# Patient Record
Sex: FEMALE | Race: WHITE | NOT HISPANIC OR LATINO | Employment: OTHER | URBAN - METROPOLITAN AREA
[De-identification: names, ages, dates, MRNs, and addresses within clinical notes are randomized per-mention and may not be internally consistent; named-entity substitution may affect disease eponyms.]

---

## 2017-01-03 ENCOUNTER — HISTORICAL (OUTPATIENT)
Dept: RADIOLOGY | Facility: HOSPITAL | Age: 72
End: 2017-01-03

## 2017-01-05 ENCOUNTER — HISTORICAL (OUTPATIENT)
Dept: ADMINISTRATIVE | Facility: HOSPITAL | Age: 72
End: 2017-01-05

## 2017-02-15 ENCOUNTER — HISTORICAL (OUTPATIENT)
Dept: RADIOLOGY | Facility: HOSPITAL | Age: 72
End: 2017-02-15

## 2017-03-15 ENCOUNTER — HISTORICAL (OUTPATIENT)
Dept: ADMINISTRATIVE | Facility: HOSPITAL | Age: 72
End: 2017-03-15

## 2017-10-30 ENCOUNTER — HISTORICAL (OUTPATIENT)
Dept: ADMINISTRATIVE | Facility: HOSPITAL | Age: 72
End: 2017-10-30

## 2017-10-30 LAB
BUN SERPL-MCNC: 12 MG/DL (ref 7–18)
CALCIUM SERPL-MCNC: 9.3 MG/DL (ref 8.5–10.1)
CHLORIDE SERPL-SCNC: 106 MMOL/L (ref 98–107)
CO2 SERPL-SCNC: 28 MMOL/L (ref 21–32)
CREAT SERPL-MCNC: 0.75 MG/DL (ref 0.55–1.02)
DEPRECATED CALCIDIOL+CALCIFEROL SERPL-MC: 41.97 NG/ML (ref 30–80)
GLUCOSE SERPL-MCNC: 89 MG/DL (ref 74–106)
POTASSIUM SERPL-SCNC: 4.6 MMOL/L (ref 3.5–5.1)
SODIUM SERPL-SCNC: 141 MMOL/L (ref 136–145)
TSH SERPL-ACNC: 2.68 MIU/ML (ref 0.36–3.74)

## 2018-05-15 ENCOUNTER — HISTORICAL (OUTPATIENT)
Dept: ADMINISTRATIVE | Facility: HOSPITAL | Age: 73
End: 2018-05-15

## 2018-05-15 LAB
ABS NEUT (OLG): 3.11 X10(3)/MCL (ref 2.1–9.2)
ALBUMIN SERPL-MCNC: 3.9 GM/DL (ref 3.4–5)
ALBUMIN/GLOB SERPL: 1.3 {RATIO}
ALP SERPL-CCNC: 81 UNIT/L (ref 38–126)
ALT SERPL-CCNC: 25 UNIT/L (ref 12–78)
APPEARANCE, UA: CLEAR
AST SERPL-CCNC: 15 UNIT/L (ref 15–37)
BACTERIA SPEC CULT: ABNORMAL /HPF
BASOPHILS # BLD AUTO: 0 X10(3)/MCL (ref 0–0.2)
BASOPHILS NFR BLD AUTO: 1 %
BILIRUB SERPL-MCNC: 0.6 MG/DL (ref 0.2–1)
BILIRUB UR QL STRIP: NEGATIVE
BILIRUBIN DIRECT+TOT PNL SERPL-MCNC: 0.2 MG/DL (ref 0–0.2)
BILIRUBIN DIRECT+TOT PNL SERPL-MCNC: 0.4 MG/DL (ref 0–0.8)
BUN SERPL-MCNC: 17 MG/DL (ref 7–18)
CALCIUM SERPL-MCNC: 9.7 MG/DL (ref 8.5–10.1)
CHLORIDE SERPL-SCNC: 104 MMOL/L (ref 98–107)
CHOLEST SERPL-MCNC: 233 MG/DL (ref 0–200)
CHOLEST/HDLC SERPL: 4.2 {RATIO} (ref 0–4)
CO2 SERPL-SCNC: 30 MMOL/L (ref 21–32)
COLOR UR: YELLOW
CREAT SERPL-MCNC: 0.93 MG/DL (ref 0.55–1.02)
DEPRECATED CALCIDIOL+CALCIFEROL SERPL-MC: 52 NG/ML (ref 30–80)
EOSINOPHIL # BLD AUTO: 0.4 X10(3)/MCL (ref 0–0.9)
EOSINOPHIL NFR BLD AUTO: 7 %
ERYTHROCYTE [DISTWIDTH] IN BLOOD BY AUTOMATED COUNT: 14.5 % (ref 11.5–17)
GLOBULIN SER-MCNC: 3.1 GM/DL (ref 2.4–3.5)
GLUCOSE (UA): NEGATIVE
GLUCOSE SERPL-MCNC: 91 MG/DL (ref 74–106)
HCT VFR BLD AUTO: 45.3 % (ref 37–47)
HDLC SERPL-MCNC: 55 MG/DL (ref 35–60)
HGB BLD-MCNC: 14.2 GM/DL (ref 12–16)
HGB UR QL STRIP: NEGATIVE
KETONES UR QL STRIP: NEGATIVE
LDLC SERPL CALC-MCNC: 159 MG/DL (ref 0–129)
LEUKOCYTE ESTERASE UR QL STRIP: ABNORMAL
LYMPHOCYTES # BLD AUTO: 1.7 X10(3)/MCL (ref 0.6–4.6)
LYMPHOCYTES NFR BLD AUTO: 29 %
MCH RBC QN AUTO: 26.8 PG (ref 27–31)
MCHC RBC AUTO-ENTMCNC: 31.3 GM/DL (ref 33–36)
MCV RBC AUTO: 85.5 FL (ref 80–94)
MONOCYTES # BLD AUTO: 0.6 X10(3)/MCL (ref 0.1–1.3)
MONOCYTES NFR BLD AUTO: 10 %
NEUTROPHILS # BLD AUTO: 3.11 X10(3)/MCL (ref 2.1–9.2)
NEUTROPHILS NFR BLD AUTO: 53 %
NITRITE UR QL STRIP: NEGATIVE
PH UR STRIP: 6.5 [PH] (ref 5–9)
PLATELET # BLD AUTO: 292 X10(3)/MCL (ref 130–400)
PMV BLD AUTO: 9.4 FL (ref 9.4–12.4)
POTASSIUM SERPL-SCNC: 4.7 MMOL/L (ref 3.5–5.1)
PROT SERPL-MCNC: 7 GM/DL (ref 6.4–8.2)
PROT UR QL STRIP: NEGATIVE
RBC # BLD AUTO: 5.3 X10(6)/MCL (ref 4.2–5.4)
RBC #/AREA URNS HPF: ABNORMAL /HPF
SODIUM SERPL-SCNC: 142 MMOL/L (ref 136–145)
SP GR UR STRIP: 1.02 (ref 1–1.03)
SQUAMOUS EPITHELIAL, UA: ABNORMAL
TRIGL SERPL-MCNC: 93 MG/DL (ref 30–150)
TSH SERPL-ACNC: 6.96 MIU/L (ref 0.36–3.74)
UROBILINOGEN UR STRIP-ACNC: 1
VLDLC SERPL CALC-MCNC: 19 MG/DL
WBC # SPEC AUTO: 5.9 X10(3)/MCL (ref 4.5–11.5)
WBC #/AREA URNS HPF: 8 /HPF (ref 0–3)

## 2018-09-06 ENCOUNTER — HISTORICAL (OUTPATIENT)
Dept: LAB | Facility: HOSPITAL | Age: 73
End: 2018-09-06

## 2018-09-06 LAB — TSH SERPL-ACNC: 0.09 MIU/ML (ref 0.36–3.74)

## 2018-12-10 ENCOUNTER — HISTORICAL (OUTPATIENT)
Dept: LAB | Facility: HOSPITAL | Age: 73
End: 2018-12-10

## 2018-12-10 LAB
ABS NEUT (OLG): 5.88 X10(3)/MCL (ref 2.1–9.2)
BASOPHILS # BLD AUTO: 0 X10(3)/MCL (ref 0–0.2)
BASOPHILS NFR BLD AUTO: 1 %
BUN SERPL-MCNC: 13.8 MG/DL (ref 7–18)
CALCIUM SERPL-MCNC: 9.8 MG/DL (ref 8.5–10.1)
CHLORIDE SERPL-SCNC: 101 MMOL/L (ref 98–107)
CO2 SERPL-SCNC: 27.8 MMOL/L (ref 21–32)
CREAT SERPL-MCNC: 0.86 MG/DL (ref 0.6–1.3)
CREAT/UREA NIT SERPL: 16
EOSINOPHIL # BLD AUTO: 0.2 X10(3)/MCL (ref 0–0.9)
EOSINOPHIL NFR BLD AUTO: 2 %
ERYTHROCYTE [DISTWIDTH] IN BLOOD BY AUTOMATED COUNT: 14.1 % (ref 11.5–17)
GLUCOSE SERPL-MCNC: 95 MG/DL (ref 74–106)
HCT VFR BLD AUTO: 46.9 % (ref 37–47)
HGB BLD-MCNC: 15.3 GM/DL (ref 12–16)
IMM GRANULOCYTES # BLD AUTO: 0.01 % (ref 0–0.02)
IMM GRANULOCYTES NFR BLD AUTO: 0.1 % (ref 0–0.43)
LYMPHOCYTES # BLD AUTO: 2.2 X10(3)/MCL (ref 0.6–4.6)
LYMPHOCYTES NFR BLD AUTO: 25 %
MCH RBC QN AUTO: 26.8 PG (ref 27–31)
MCHC RBC AUTO-ENTMCNC: 32.6 GM/DL (ref 33–36)
MCV RBC AUTO: 82.1 FL (ref 80–94)
MONOCYTES # BLD AUTO: 0.6 X10(3)/MCL (ref 0.1–1.3)
MONOCYTES NFR BLD AUTO: 6 %
NEUTROPHILS # BLD AUTO: 5.88 X10(3)/MCL (ref 1.4–7.9)
NEUTROPHILS NFR BLD AUTO: 66 %
PLATELET # BLD AUTO: 356 X10(3)/MCL (ref 130–400)
PMV BLD AUTO: 9 FL (ref 9.4–12.4)
POTASSIUM SERPL-SCNC: 3.8 MMOL/L (ref 3.5–5.1)
RBC # BLD AUTO: 5.71 X10(6)/MCL (ref 4.2–5.4)
SODIUM SERPL-SCNC: 139 MMOL/L (ref 136–145)
TSH SERPL-ACNC: 1.41 MIU/ML (ref 0.36–3.74)
WBC # SPEC AUTO: 9 X10(3)/MCL (ref 4.5–11.5)

## 2018-12-17 ENCOUNTER — HISTORICAL (OUTPATIENT)
Dept: RADIOLOGY | Facility: HOSPITAL | Age: 73
End: 2018-12-17

## 2019-06-11 ENCOUNTER — HISTORICAL (OUTPATIENT)
Dept: LAB | Facility: HOSPITAL | Age: 74
End: 2019-06-11

## 2019-06-11 LAB
ABS NEUT (OLG): 2.81 X10(3)/MCL (ref 2.1–9.2)
ALBUMIN SERPL-MCNC: 3.9 GM/DL (ref 3.4–5)
ALBUMIN/GLOB SERPL: 1.2 RATIO (ref 1.1–2)
ALP SERPL-CCNC: 74 UNIT/L (ref 46–116)
ALT SERPL-CCNC: 32 UNIT/L (ref 12–78)
APPEARANCE, UA: CLEAR
AST SERPL-CCNC: 29 UNIT/L (ref 15–37)
BACTERIA SPEC CULT: NORMAL
BASOPHILS # BLD AUTO: 0 X10(3)/MCL (ref 0–0.2)
BASOPHILS NFR BLD AUTO: 1 %
BILIRUB SERPL-MCNC: 1.1 MG/DL (ref 0.2–1)
BILIRUB UR QL STRIP: NEGATIVE
BILIRUBIN DIRECT+TOT PNL SERPL-MCNC: 0.26 MG/DL (ref 0–0.2)
BILIRUBIN DIRECT+TOT PNL SERPL-MCNC: 0.84 MG/DL (ref 0–0.8)
BUN SERPL-MCNC: 14.2 MG/DL (ref 7–18)
CALCIUM SERPL-MCNC: 9.5 MG/DL (ref 8.5–10.1)
CHLORIDE SERPL-SCNC: 104 MMOL/L (ref 98–107)
CHOLEST SERPL-MCNC: 195 MG/DL (ref 0–200)
CHOLEST/HDLC SERPL: 4.1 {RATIO} (ref 0–4)
CO2 SERPL-SCNC: 27.1 MMOL/L (ref 21–32)
COLOR UR: YELLOW
CREAT SERPL-MCNC: 0.96 MG/DL (ref 0.6–1.3)
DEPRECATED CALCIDIOL+CALCIFEROL SERPL-MC: 59.54 NG/ML (ref 30–80)
EOSINOPHIL # BLD AUTO: 0.2 X10(3)/MCL (ref 0–0.9)
EOSINOPHIL NFR BLD AUTO: 4 %
ERYTHROCYTE [DISTWIDTH] IN BLOOD BY AUTOMATED COUNT: 14.2 % (ref 11.5–17)
GLOBULIN SER-MCNC: 3.2 GM/DL (ref 2.4–3.5)
GLUCOSE (UA): NEGATIVE
GLUCOSE SERPL-MCNC: 101 MG/DL (ref 74–106)
HCT VFR BLD AUTO: 41.3 % (ref 37–47)
HDLC SERPL-MCNC: 48 MG/DL (ref 40–60)
HGB BLD-MCNC: 14.3 GM/DL (ref 12–16)
HGB UR QL STRIP: NEGATIVE
KETONES UR QL STRIP: NEGATIVE
LDLC SERPL CALC-MCNC: 135 MG/DL (ref 0–129)
LEUKOCYTE ESTERASE UR QL STRIP: NEGATIVE
LYMPHOCYTES # BLD AUTO: 2 X10(3)/MCL (ref 0.6–4.6)
LYMPHOCYTES NFR BLD AUTO: 36 %
MCH RBC QN AUTO: 27.4 PG (ref 27–31)
MCHC RBC AUTO-ENTMCNC: 34.6 GM/DL (ref 33–36)
MCV RBC AUTO: 79.3 FL (ref 80–94)
MONOCYTES # BLD AUTO: 0.5 X10(3)/MCL (ref 0.1–1.3)
MONOCYTES NFR BLD AUTO: 9 %
NEUTROPHILS # BLD AUTO: 2.81 X10(3)/MCL (ref 1.4–7.9)
NEUTROPHILS NFR BLD AUTO: 50 %
NITRITE UR QL STRIP: NEGATIVE
PH UR STRIP: 6 [PH] (ref 5–9)
PLATELET # BLD AUTO: 285 X10(3)/MCL (ref 130–400)
PMV BLD AUTO: 8.8 FL (ref 9.4–12.4)
POTASSIUM SERPL-SCNC: 3.6 MMOL/L (ref 3.5–5.1)
PROT SERPL-MCNC: 7.1 GM/DL (ref 6.4–8.2)
PROT UR QL STRIP: NEGATIVE
RBC # BLD AUTO: 5.21 X10(6)/MCL (ref 4.2–5.4)
RBC #/AREA URNS HPF: NORMAL /[HPF]
SODIUM SERPL-SCNC: 144 MMOL/L (ref 136–145)
SP GR UR STRIP: 1.01 (ref 1–1.03)
SQUAMOUS EPITHELIAL, UA: NORMAL
TRIGL SERPL-MCNC: 62 MG/DL
TSH SERPL-ACNC: 1.29 MIU/ML (ref 0.36–3.74)
UROBILINOGEN UR STRIP-ACNC: 0.2
VLDLC SERPL CALC-MCNC: 12 MG/DL
WBC # SPEC AUTO: 5.6 X10(3)/MCL (ref 4.5–11.5)
WBC #/AREA URNS HPF: NORMAL /[HPF]

## 2019-12-31 ENCOUNTER — HISTORICAL (OUTPATIENT)
Dept: LAB | Facility: HOSPITAL | Age: 74
End: 2019-12-31

## 2019-12-31 LAB
BUN SERPL-MCNC: 13.6 MG/DL (ref 7–18)
CALCIUM SERPL-MCNC: 9.7 MG/DL (ref 8.5–10.1)
CHLORIDE SERPL-SCNC: 104 MMOL/L (ref 98–107)
CO2 SERPL-SCNC: 28.5 MMOL/L (ref 21–32)
CREAT SERPL-MCNC: 0.83 MG/DL (ref 0.6–1.3)
CREAT/UREA NIT SERPL: 16
GLUCOSE SERPL-MCNC: 99 MG/DL (ref 74–106)
POTASSIUM SERPL-SCNC: 4.1 MMOL/L (ref 3.5–5.1)
SODIUM SERPL-SCNC: 143 MMOL/L (ref 136–145)
TSH SERPL-ACNC: 3.15 MIU/ML (ref 0.36–3.74)

## 2020-08-17 ENCOUNTER — HISTORICAL (OUTPATIENT)
Dept: LAB | Facility: HOSPITAL | Age: 75
End: 2020-08-17

## 2020-08-17 LAB
ABS NEUT (OLG): 2.68 X10(3)/MCL (ref 2.1–9.2)
ALBUMIN SERPL-MCNC: 3.8 GM/DL (ref 3.4–5)
ALBUMIN/GLOB SERPL: 1.2 RATIO (ref 1.1–2)
ALP SERPL-CCNC: 82 UNIT/L (ref 46–116)
ALT SERPL-CCNC: 24 UNIT/L (ref 12–78)
AST SERPL-CCNC: 17 UNIT/L (ref 15–37)
BASOPHILS # BLD AUTO: 0.1 X10(3)/MCL (ref 0–0.2)
BASOPHILS NFR BLD AUTO: 1 %
BILIRUB SERPL-MCNC: 0.7 MG/DL (ref 0.2–1)
BILIRUBIN DIRECT+TOT PNL SERPL-MCNC: 0.12 MG/DL (ref 0–0.2)
BILIRUBIN DIRECT+TOT PNL SERPL-MCNC: 0.58 MG/DL (ref 0–0.8)
BUN SERPL-MCNC: 12.1 MG/DL (ref 7–18)
CALCIUM SERPL-MCNC: 10 MG/DL (ref 8.5–10.1)
CHLORIDE SERPL-SCNC: 104 MMOL/L (ref 98–107)
CHOLEST SERPL-MCNC: 218 MG/DL (ref 0–200)
CHOLEST/HDLC SERPL: 4 {RATIO} (ref 0–4)
CO2 SERPL-SCNC: 27.6 MMOL/L (ref 21–32)
CREAT SERPL-MCNC: 0.84 MG/DL (ref 0.6–1.3)
DEPRECATED CALCIDIOL+CALCIFEROL SERPL-MC: 101.6 NG/ML (ref 6.6–49.9)
EOSINOPHIL # BLD AUTO: 0.3 X10(3)/MCL (ref 0–0.9)
EOSINOPHIL NFR BLD AUTO: 6 %
ERYTHROCYTE [DISTWIDTH] IN BLOOD BY AUTOMATED COUNT: 13.6 % (ref 11.5–17)
GLOBULIN SER-MCNC: 3.2 GM/DL (ref 2.4–3.5)
GLUCOSE SERPL-MCNC: 92 MG/DL (ref 74–106)
HCT VFR BLD AUTO: 43.6 % (ref 37–47)
HDLC SERPL-MCNC: 54 MG/DL (ref 40–60)
HGB BLD-MCNC: 14.3 GM/DL (ref 12–16)
IMM GRANULOCYTES # BLD AUTO: 0.01 % (ref 0–0.02)
IMM GRANULOCYTES NFR BLD AUTO: 0.2 % (ref 0–0.43)
LDLC SERPL CALC-MCNC: 140 MG/DL (ref 0–129)
LYMPHOCYTES # BLD AUTO: 1.6 X10(3)/MCL (ref 0.6–4.6)
LYMPHOCYTES NFR BLD AUTO: 30 %
MCH RBC QN AUTO: 27.4 PG (ref 27–31)
MCHC RBC AUTO-ENTMCNC: 32.8 GM/DL (ref 33–36)
MCV RBC AUTO: 83.7 FL (ref 80–94)
MONOCYTES # BLD AUTO: 0.6 X10(3)/MCL (ref 0.1–1.3)
MONOCYTES NFR BLD AUTO: 11 %
NEUTROPHILS # BLD AUTO: 2.68 X10(3)/MCL (ref 1.4–7.9)
NEUTROPHILS NFR BLD AUTO: 52 %
PLATELET # BLD AUTO: 306 X10(3)/MCL (ref 130–400)
PMV BLD AUTO: 9.4 FL (ref 9.4–12.4)
POTASSIUM SERPL-SCNC: 4.6 MMOL/L (ref 3.5–5.1)
PROT SERPL-MCNC: 7 GM/DL (ref 6.4–8.2)
RBC # BLD AUTO: 5.21 X10(6)/MCL (ref 4.2–5.4)
SODIUM SERPL-SCNC: 143 MMOL/L (ref 136–145)
TRIGL SERPL-MCNC: 119 MG/DL
TSH SERPL-ACNC: 0.61 MIU/ML (ref 0.36–3.74)
VLDLC SERPL CALC-MCNC: 24 MG/DL
WBC # SPEC AUTO: 5.2 X10(3)/MCL (ref 4.5–11.5)

## 2021-08-16 ENCOUNTER — HISTORICAL (OUTPATIENT)
Dept: LAB | Facility: HOSPITAL | Age: 76
End: 2021-08-16

## 2021-08-16 LAB
ABS NEUT (OLG): 3.72 X10(3)/MCL (ref 2.1–9.2)
ALBUMIN SERPL-MCNC: 3.8 GM/DL (ref 3.4–4.8)
ALBUMIN/GLOB SERPL: 1.4 RATIO (ref 1.1–2)
ALP SERPL-CCNC: 74 UNIT/L (ref 40–150)
ALT SERPL-CCNC: 16 UNIT/L (ref 0–55)
APPEARANCE, UA: CLEAR
AST SERPL-CCNC: 18 UNIT/L (ref 5–34)
BACTERIA SPEC CULT: NORMAL
BASOPHILS # BLD AUTO: 0 X10(3)/MCL (ref 0–0.2)
BASOPHILS NFR BLD AUTO: 1 %
BILIRUB SERPL-MCNC: 0.7 MG/DL
BILIRUB UR QL STRIP: NEGATIVE
BILIRUBIN DIRECT+TOT PNL SERPL-MCNC: 0.3 MG/DL (ref 0–0.5)
BILIRUBIN DIRECT+TOT PNL SERPL-MCNC: 0.4 MG/DL (ref 0–0.8)
BUN SERPL-MCNC: 11.2 MG/DL (ref 9.8–20.1)
CALCIUM SERPL-MCNC: 9.6 MG/DL (ref 8.4–10.2)
CHLORIDE SERPL-SCNC: 106 MMOL/L (ref 98–107)
CHOLEST SERPL-MCNC: 186 MG/DL
CHOLEST/HDLC SERPL: 3 {RATIO} (ref 0–5)
CO2 SERPL-SCNC: 25 MMOL/L (ref 23–31)
COLOR UR: YELLOW
CREAT SERPL-MCNC: 0.72 MG/DL (ref 0.55–1.02)
DEPRECATED CALCIDIOL+CALCIFEROL SERPL-MC: 109.5 NG/ML (ref 30–80)
EOSINOPHIL # BLD AUTO: 0.4 X10(3)/MCL (ref 0–0.9)
EOSINOPHIL NFR BLD AUTO: 5 %
ERYTHROCYTE [DISTWIDTH] IN BLOOD BY AUTOMATED COUNT: 14 % (ref 11.5–17)
GLOBULIN SER-MCNC: 2.8 GM/DL (ref 2.4–3.5)
GLUCOSE (UA): NEGATIVE
GLUCOSE SERPL-MCNC: 78 MG/DL (ref 82–115)
HCT VFR BLD AUTO: 45.1 % (ref 37–47)
HDLC SERPL-MCNC: 56 MG/DL (ref 35–60)
HGB BLD-MCNC: 14.9 GM/DL (ref 12–16)
HGB UR QL STRIP: NEGATIVE
IMM GRANULOCYTES # BLD AUTO: 0.01 % (ref 0–0.02)
IMM GRANULOCYTES NFR BLD AUTO: 0.1 % (ref 0–0.43)
KETONES UR QL STRIP: NEGATIVE
LDLC SERPL CALC-MCNC: 113 MG/DL (ref 50–140)
LEUKOCYTE ESTERASE UR QL STRIP: NORMAL
LYMPHOCYTES # BLD AUTO: 2.4 X10(3)/MCL (ref 0.6–4.6)
LYMPHOCYTES NFR BLD AUTO: 34 %
MCH RBC QN AUTO: 27.9 PG (ref 27–31)
MCHC RBC AUTO-ENTMCNC: 33 GM/DL (ref 33–36)
MCV RBC AUTO: 84.5 FL (ref 80–94)
MONOCYTES # BLD AUTO: 0.5 X10(3)/MCL (ref 0.1–1.3)
MONOCYTES NFR BLD AUTO: 7 %
NEUTROPHILS # BLD AUTO: 3.72 X10(3)/MCL (ref 1.4–7.9)
NEUTROPHILS NFR BLD AUTO: 53 %
NITRITE UR QL STRIP: NEGATIVE
PH UR STRIP: 7 [PH] (ref 5–9)
PLATELET # BLD AUTO: 331 X10(3)/MCL (ref 130–400)
PMV BLD AUTO: 9.3 FL (ref 9.4–12.4)
POTASSIUM SERPL-SCNC: 3.5 MMOL/L (ref 3.5–5.1)
PROT SERPL-MCNC: 6.6 GM/DL (ref 5.8–7.6)
PROT UR QL STRIP: NEGATIVE
RBC # BLD AUTO: 5.34 X10(6)/MCL (ref 4.2–5.4)
RBC #/AREA URNS HPF: NORMAL /[HPF]
SODIUM SERPL-SCNC: 143 MMOL/L (ref 136–145)
SP GR UR STRIP: 1.02 (ref 1–1.03)
SQUAMOUS EPITHELIAL, UA: NORMAL
TRIGL SERPL-MCNC: 85 MG/DL (ref 37–140)
TSH SERPL-ACNC: 0.34 UIU/ML (ref 0.35–4.94)
UROBILINOGEN UR STRIP-ACNC: 0.2
VLDLC SERPL CALC-MCNC: 17 MG/DL
WBC # SPEC AUTO: 7 X10(3)/MCL (ref 4.5–11.5)
WBC #/AREA URNS HPF: NORMAL /HPF

## 2022-03-21 ENCOUNTER — HISTORICAL (OUTPATIENT)
Dept: LAB | Facility: HOSPITAL | Age: 77
End: 2022-03-21

## 2022-03-21 LAB
ABS NEUT (OLG): 2.98 (ref 2.1–9.2)
BASOPHILS # BLD AUTO: 0 10*3/UL (ref 0–0.2)
BASOPHILS NFR BLD AUTO: 1 %
BUN SERPL-MCNC: 7.9 MG/DL (ref 9.8–20.1)
CALCIUM SERPL-MCNC: 9.9 MG/DL (ref 8.7–10.5)
CHLORIDE SERPL-SCNC: 108 MMOL/L (ref 98–107)
CO2 SERPL-SCNC: 24 MMOL/L (ref 23–31)
CREAT SERPL-MCNC: 0.76 MG/DL (ref 0.55–1.02)
CREAT/UREA NIT SERPL: 10
EOSINOPHIL # BLD AUTO: 0.3 10*3/UL (ref 0–0.9)
EOSINOPHIL NFR BLD AUTO: 5 %
ERYTHROCYTE [DISTWIDTH] IN BLOOD BY AUTOMATED COUNT: 14.9 % (ref 11.5–17)
GLUCOSE SERPL-MCNC: 106 MG/DL (ref 82–115)
HCT VFR BLD AUTO: 45.3 % (ref 37–47)
HEMOLYSIS INTERF INDEX SERPL-ACNC: 5
HGB BLD-MCNC: 14.3 G/DL (ref 12–16)
ICTERIC INTERF INDEX SERPL-ACNC: 1
IMM GRANULOCYTES # BLD AUTO: 0.01 10*3/UL (ref 0–0.02)
IMM GRANULOCYTES NFR BLD AUTO: 0.2 % (ref 0–0.43)
LIPEMIC INTERF INDEX SERPL-ACNC: <0
LYMPHOCYTES # BLD AUTO: 2 10*3/UL (ref 0.6–4.6)
LYMPHOCYTES NFR BLD AUTO: 35 %
MANUAL DIFF? (OHS): NO
MCH RBC QN AUTO: 27.3 PG (ref 27–31)
MCHC RBC AUTO-ENTMCNC: 31.6 G/DL (ref 33–36)
MCV RBC AUTO: 86.5 FL (ref 80–94)
MONOCYTES # BLD AUTO: 0.4 10*3/UL (ref 0.1–1.3)
MONOCYTES NFR BLD AUTO: 8 %
NEUTROPHILS # BLD AUTO: 2.98 10*3/UL (ref 1.4–7.9)
NEUTROPHILS NFR BLD AUTO: 51 %
PLATELET # BLD AUTO: 365 10*3/UL (ref 130–400)
PMV BLD AUTO: 9 FL (ref 9.4–12.4)
POTASSIUM SERPL-SCNC: 4.6 MMOL/L (ref 3.5–5.1)
RBC # BLD AUTO: 5.24 10*6/UL (ref 4.2–5.4)
SODIUM SERPL-SCNC: 143 MMOL/L (ref 136–145)
TSH SERPL-ACNC: 4.27 M[IU]/L (ref 0.35–4.94)
WBC # SPEC AUTO: 5.8 10*3/UL (ref 4.5–11.5)

## 2022-03-28 ENCOUNTER — HISTORICAL (OUTPATIENT)
Dept: ADMINISTRATIVE | Facility: HOSPITAL | Age: 77
End: 2022-03-28

## 2022-10-18 ENCOUNTER — LAB VISIT (OUTPATIENT)
Dept: LAB | Facility: HOSPITAL | Age: 77
End: 2022-10-18
Attending: INTERNAL MEDICINE
Payer: MEDICARE

## 2022-10-18 DIAGNOSIS — B36.9: Primary | ICD-10-CM

## 2022-10-18 DIAGNOSIS — M85.88 MASS OF HARD PALATE: ICD-10-CM

## 2022-10-18 DIAGNOSIS — I51.9 MYXEDEMA HEART DISEASE: ICD-10-CM

## 2022-10-18 DIAGNOSIS — I10 ESSENTIAL HYPERTENSION, MALIGNANT: ICD-10-CM

## 2022-10-18 DIAGNOSIS — E03.9 MYXEDEMA HEART DISEASE: ICD-10-CM

## 2022-10-18 DIAGNOSIS — Z00.00 ROUTINE GENERAL MEDICAL EXAMINATION AT A HEALTH CARE FACILITY: ICD-10-CM

## 2022-10-18 DIAGNOSIS — E78.5 HYPERLIPIDEMIA, UNSPECIFIED HYPERLIPIDEMIA TYPE: ICD-10-CM

## 2022-10-18 DIAGNOSIS — E55.9 AVITAMINOSIS D: ICD-10-CM

## 2022-10-18 LAB
ALBUMIN SERPL-MCNC: 3.9 GM/DL (ref 3.4–4.8)
ALBUMIN/GLOB SERPL: 1.3 RATIO (ref 1.1–2)
ALP SERPL-CCNC: 88 UNIT/L (ref 40–150)
ALT SERPL-CCNC: 19 UNIT/L (ref 0–55)
APPEARANCE UR: CLEAR
AST SERPL-CCNC: 19 UNIT/L (ref 5–34)
BACTERIA #/AREA URNS AUTO: NORMAL /HPF
BASOPHILS # BLD AUTO: 0.04 X10(3)/MCL (ref 0–0.2)
BASOPHILS NFR BLD AUTO: 0.7 %
BILIRUB UR QL STRIP.AUTO: NEGATIVE MG/DL
BILIRUBIN DIRECT+TOT PNL SERPL-MCNC: 1.2 MG/DL
BUN SERPL-MCNC: 10.9 MG/DL (ref 9.8–20.1)
CALCIUM SERPL-MCNC: 10 MG/DL (ref 8.4–10.2)
CHLORIDE SERPL-SCNC: 107 MMOL/L (ref 98–107)
CHOLEST SERPL-MCNC: 194 MG/DL
CHOLEST/HDLC SERPL: 3 {RATIO} (ref 0–5)
CO2 SERPL-SCNC: 26 MMOL/L (ref 23–31)
COLOR UR AUTO: YELLOW
CREAT SERPL-MCNC: 0.75 MG/DL (ref 0.55–1.02)
DEPRECATED CALCIDIOL+CALCIFEROL SERPL-MC: 90.2 NG/ML (ref 30–80)
EOSINOPHIL # BLD AUTO: 0.41 X10(3)/MCL (ref 0–0.9)
EOSINOPHIL NFR BLD AUTO: 7.1 %
ERYTHROCYTE [DISTWIDTH] IN BLOOD BY AUTOMATED COUNT: 13.5 % (ref 11.5–17)
GFR SERPLBLD CREATININE-BSD FMLA CKD-EPI: >60 MLS/MIN/1.73/M2
GLOBULIN SER-MCNC: 3 GM/DL (ref 2.4–3.5)
GLUCOSE SERPL-MCNC: 96 MG/DL (ref 82–115)
GLUCOSE UR QL STRIP.AUTO: NEGATIVE MG/DL
HCT VFR BLD AUTO: 47.8 % (ref 37–47)
HDLC SERPL-MCNC: 59 MG/DL (ref 35–60)
HGB BLD-MCNC: 15 GM/DL (ref 12–16)
IMM GRANULOCYTES # BLD AUTO: 0.01 X10(3)/MCL (ref 0–0.04)
IMM GRANULOCYTES NFR BLD AUTO: 0.2 %
KETONES UR QL STRIP.AUTO: NEGATIVE MG/DL
LDLC SERPL CALC-MCNC: 117 MG/DL (ref 50–140)
LEUKOCYTE ESTERASE UR QL STRIP.AUTO: ABNORMAL UNIT/L
LYMPHOCYTES # BLD AUTO: 2.09 X10(3)/MCL (ref 0.6–4.6)
LYMPHOCYTES NFR BLD AUTO: 36 %
MCH RBC QN AUTO: 27.7 PG (ref 27–31)
MCHC RBC AUTO-ENTMCNC: 31.4 MG/DL (ref 33–36)
MCV RBC AUTO: 88.2 FL (ref 80–94)
MONOCYTES # BLD AUTO: 0.33 X10(3)/MCL (ref 0.1–1.3)
MONOCYTES NFR BLD AUTO: 5.7 %
NEUTROPHILS # BLD AUTO: 2.9 X10(3)/MCL (ref 2.1–9.2)
NEUTROPHILS NFR BLD AUTO: 50.3 %
NITRITE UR QL STRIP.AUTO: NEGATIVE
PH UR STRIP.AUTO: 6.5 [PH]
PLATELET # BLD AUTO: 300 X10(3)/MCL (ref 130–400)
PMV BLD AUTO: 9.5 FL (ref 7.4–10.4)
POTASSIUM SERPL-SCNC: 4.6 MMOL/L (ref 3.5–5.1)
PROT SERPL-MCNC: 6.9 GM/DL (ref 5.8–7.6)
PROT UR QL STRIP.AUTO: NEGATIVE MG/DL
RBC # BLD AUTO: 5.42 X10(6)/MCL (ref 4.2–5.4)
RBC #/AREA URNS AUTO: NORMAL /HPF
RBC UR QL AUTO: NEGATIVE UNIT/L
SODIUM SERPL-SCNC: 143 MMOL/L (ref 136–145)
SP GR UR STRIP.AUTO: 1.02
SQUAMOUS #/AREA URNS AUTO: NORMAL /HPF
TRIGL SERPL-MCNC: 89 MG/DL (ref 37–140)
TSH SERPL-ACNC: 1.63 UIU/ML (ref 0.35–4.94)
UROBILINOGEN UR STRIP-ACNC: 0.2 MG/DL
VLDLC SERPL CALC-MCNC: 18 MG/DL
WBC # SPEC AUTO: 5.8 X10(3)/MCL (ref 4.5–11.5)
WBC #/AREA URNS AUTO: NORMAL /HPF

## 2022-10-18 PROCEDURE — 81001 URINALYSIS AUTO W/SCOPE: CPT

## 2022-10-18 PROCEDURE — 84443 ASSAY THYROID STIM HORMONE: CPT

## 2022-10-18 PROCEDURE — 80061 LIPID PANEL: CPT

## 2022-10-18 PROCEDURE — 85025 COMPLETE CBC W/AUTO DIFF WBC: CPT

## 2022-10-18 PROCEDURE — 80053 COMPREHEN METABOLIC PANEL: CPT

## 2022-10-18 PROCEDURE — 36415 COLL VENOUS BLD VENIPUNCTURE: CPT

## 2022-10-18 PROCEDURE — 82306 VITAMIN D 25 HYDROXY: CPT

## 2022-10-26 ENCOUNTER — TELEPHONE (OUTPATIENT)
Dept: INTERNAL MEDICINE | Facility: CLINIC | Age: 77
End: 2022-10-26
Payer: MEDICARE

## 2022-10-28 RX ORDER — BUSPIRONE HYDROCHLORIDE 15 MG/1
15 TABLET ORAL DAILY
COMMUNITY
Start: 2022-09-19 | End: 2022-11-02

## 2022-10-28 RX ORDER — LOSARTAN POTASSIUM 100 MG/1
100 TABLET ORAL DAILY
COMMUNITY
Start: 2022-10-04 | End: 2023-04-03

## 2022-11-01 ENCOUNTER — OFFICE VISIT (OUTPATIENT)
Dept: INTERNAL MEDICINE | Facility: CLINIC | Age: 77
End: 2022-11-01
Payer: MEDICARE

## 2022-11-01 VITALS
DIASTOLIC BLOOD PRESSURE: 76 MMHG | SYSTOLIC BLOOD PRESSURE: 132 MMHG | HEART RATE: 73 BPM | BODY MASS INDEX: 32.71 KG/M2 | HEIGHT: 63 IN | OXYGEN SATURATION: 98 % | WEIGHT: 184.63 LBS

## 2022-11-01 DIAGNOSIS — Z23 FLU VACCINE NEED: ICD-10-CM

## 2022-11-01 DIAGNOSIS — Z00.00 WELLNESS EXAMINATION: Primary | ICD-10-CM

## 2022-11-01 DIAGNOSIS — I10 ESSENTIAL (PRIMARY) HYPERTENSION: ICD-10-CM

## 2022-11-01 DIAGNOSIS — E55.9 VITAMIN D DEFICIENCY: ICD-10-CM

## 2022-11-01 DIAGNOSIS — E03.9 HYPOTHYROIDISM, UNSPECIFIED TYPE: ICD-10-CM

## 2022-11-01 DIAGNOSIS — F41.9 ANXIETY DISORDER, UNSPECIFIED TYPE: ICD-10-CM

## 2022-11-01 DIAGNOSIS — M79.604 PAIN OF RIGHT LOWER EXTREMITY: ICD-10-CM

## 2022-11-01 DIAGNOSIS — M85.80 OSTEOPENIA, UNSPECIFIED LOCATION: ICD-10-CM

## 2022-11-01 DIAGNOSIS — R79.89 ABNORMAL LIVER FUNCTION TEST: ICD-10-CM

## 2022-11-01 PROCEDURE — 3288F PR FALLS RISK ASSESSMENT DOCUMENTED: ICD-10-PCS | Mod: CPTII,,, | Performed by: INTERNAL MEDICINE

## 2022-11-01 PROCEDURE — G0008 ADMIN INFLUENZA VIRUS VAC: HCPCS | Mod: ,,, | Performed by: INTERNAL MEDICINE

## 2022-11-01 PROCEDURE — G0008 FLU VACCINE - QUADRIVALENT - ADJUVANTED: ICD-10-PCS | Mod: ,,, | Performed by: INTERNAL MEDICINE

## 2022-11-01 PROCEDURE — 1159F MED LIST DOCD IN RCRD: CPT | Mod: CPTII,,, | Performed by: INTERNAL MEDICINE

## 2022-11-01 PROCEDURE — 1100F PTFALLS ASSESS-DOCD GE2>/YR: CPT | Mod: CPTII,,, | Performed by: INTERNAL MEDICINE

## 2022-11-01 PROCEDURE — 3075F PR MOST RECENT SYSTOLIC BLOOD PRESS GE 130-139MM HG: ICD-10-PCS | Mod: CPTII,,, | Performed by: INTERNAL MEDICINE

## 2022-11-01 PROCEDURE — 3078F PR MOST RECENT DIASTOLIC BLOOD PRESSURE < 80 MM HG: ICD-10-PCS | Mod: CPTII,,, | Performed by: INTERNAL MEDICINE

## 2022-11-01 PROCEDURE — 3078F DIAST BP <80 MM HG: CPT | Mod: CPTII,,, | Performed by: INTERNAL MEDICINE

## 2022-11-01 PROCEDURE — G0439 PPPS, SUBSEQ VISIT: HCPCS | Mod: ,,, | Performed by: INTERNAL MEDICINE

## 2022-11-01 PROCEDURE — 1160F PR REVIEW ALL MEDS BY PRESCRIBER/CLIN PHARMACIST DOCUMENTED: ICD-10-PCS | Mod: CPTII,,, | Performed by: INTERNAL MEDICINE

## 2022-11-01 PROCEDURE — 1100F PR PT FALLS ASSESS DOC 2+ FALLS/FALL W/INJURY/YR: ICD-10-PCS | Mod: CPTII,,, | Performed by: INTERNAL MEDICINE

## 2022-11-01 PROCEDURE — G0439 PR MEDICARE ANNUAL WELLNESS SUBSEQUENT VISIT: ICD-10-PCS | Mod: ,,, | Performed by: INTERNAL MEDICINE

## 2022-11-01 PROCEDURE — 3075F SYST BP GE 130 - 139MM HG: CPT | Mod: CPTII,,, | Performed by: INTERNAL MEDICINE

## 2022-11-01 PROCEDURE — 1126F AMNT PAIN NOTED NONE PRSNT: CPT | Mod: CPTII,,, | Performed by: INTERNAL MEDICINE

## 2022-11-01 PROCEDURE — 1126F PR PAIN SEVERITY QUANTIFIED, NO PAIN PRESENT: ICD-10-PCS | Mod: CPTII,,, | Performed by: INTERNAL MEDICINE

## 2022-11-01 PROCEDURE — 3288F FALL RISK ASSESSMENT DOCD: CPT | Mod: CPTII,,, | Performed by: INTERNAL MEDICINE

## 2022-11-01 PROCEDURE — 90694 FLU VACCINE - QUADRIVALENT - ADJUVANTED: ICD-10-PCS | Mod: ,,, | Performed by: INTERNAL MEDICINE

## 2022-11-01 PROCEDURE — 1159F PR MEDICATION LIST DOCUMENTED IN MEDICAL RECORD: ICD-10-PCS | Mod: CPTII,,, | Performed by: INTERNAL MEDICINE

## 2022-11-01 PROCEDURE — 1160F RVW MEDS BY RX/DR IN RCRD: CPT | Mod: CPTII,,, | Performed by: INTERNAL MEDICINE

## 2022-11-01 PROCEDURE — 90694 VACC AIIV4 NO PRSRV 0.5ML IM: CPT | Mod: ,,, | Performed by: INTERNAL MEDICINE

## 2022-11-01 NOTE — PROGRESS NOTES
Blayne Bautista MD        PATIENT NAME: Jen Fitzgerald  : 1945  DATE: 22  MRN: [id number]      Patient Care Team:  Blayne Bautista MD as PCP - General (Internal Medicine)  Blayne Bautista MD       Billing Provider: Blayne Bautista MD  Level of Service: [place] AN[place] WELLNESS SUBSEQUENT VISIT  Patient PCP Information       Provider PCP Type    Blayne Bautista MD General            Reason for Visit / Chief Complaint: Medicare AWV (Wellness )       Update PCP  Update Chief Complaint         History of Present Illness / Problem Focused Workflow     Jen Fitzgerald presents to the clinic with Medicare AWV (Wellness )     Jen is here for annual Medicare wellness  She is doing well with no new problems   She did twist her right leg in her she is having pain in her right leg and ankle and would like physical therapy      Review of Systems   Review of Systems   Constitutional: Negative.    HENT: Negative.     Eyes: Negative.    Respiratory: Negative.     Cardiovascular: Negative.    Gastrointestinal: Negative.    Endocrine: Negative.    Genitourinary: Negative.    Musculoskeletal: Negative.    Integumentary:  Negative.   Neurological: Negative.    Psychiatric/Behavioral: Negative.        Patient Reported Health Risk Assessment  What [age] 70-79  Are you male or female?: Female  During the past four weeks, how much have you been bothered by emotional problems such as feeling anxious, depressed, irritable, sad, or downhearted and blue?: Not at all  During the past five weeks, has your physical and/or emotional health limited your social activities with family, friends, neighbors, or groups?: Not at all  During the past four weeks, how much bodily pain have you generally had?: Very mild pain  During the past four weeks, was someone available to help if you needed and wanted help?: Yes, as much as I wanted  During the past four weeks, what was the hardest physical activity you  could do for at least two minutes?: Very heavy  Can you get to places out of walking distance without help?  (For example, can you travel alone on buses or taxis, or drive your own car?): Yes  Can you go shopping for groceries or clothes without someone's help?: Yes  Can you prepare your own meals?: Yes  Can you do your own housework without help?: Yes  Because of any health problems, do you need the help of another person with your personal care needs such as eating, bathing, dressing, or getting around the house?: No  Can you handle your own money without help?: Yes  During the past four weeks, how would you rate your health in general?: Excellent  How have things been going for you during the past four weeks?: Very well  Are you having difficulties driving your car?: No  Do you always fasten your seat belt when you are in a car?: Yes, usually  How often in the past four weeks have you been bothered by falling or dizzy when standing up?: Never  How often in the past four weeks have you been bothered by trouble eating well?: Never  How often in the past four weeks have you been bothered by teeth or denture problems?: Never  How often in the past four weeks have you been bothered with problems using the telephone?: Never  How often in the past four weeks have you been bothered by tiredness or fatigue?: Seldom  Have you fallen two or more times in the past year?: Yes  Are you afraid of falling?: No  Are you a smoker?: No  During the past four weeks, how many drinks of wine, beer, or other alcoholic beverages did you have?: One drink or less per week  Do you exercise for about 20 minutes three or more days a week?: No, I usually do not exercise this much  Have you been given any information to help you with hazards in your house that might hurt you?: Yes  Have you been given any information to help you with keeping track of your medications?: Yes  How often do you have trouble taking medicines the way you've been told  to take them?: I always take them as prescribed  How confident are you that you can control and manage most of your health problems?: Very confident  What is your race? (Check all that apply.):     Medical / Social / Family History     Past Medical History:   Diagnosis Date    Abnormal liver function test     Anxiety disorder, unspecified     Essential (primary) hypertension     Hypothyroidism, unspecified     Osteopenia     Vitamin D deficiency        Past Surgical History:   Procedure Laterality Date    APPENDECTOMY      CHOLECYSTECTOMY      TONSILLECTOMY         Social History  Ms. Fitzgerald  reports that she has never smoked. She has never used smokeless tobacco. She reports that she does not currently use alcohol. She reports that she does not use drugs.    Family History  Ms.'s Fitzgerald  family history includes Asthma in her mother; Cancer in her father; Diabetes in her father; Hypertension in her mother.        Medications and Allergies     Medications  Outpatient Medications Marked as Taking for the 11/1/22 encounter (Office Visit) with Blayne Bautista MD   Medication Sig Dispense Refill    amLODIPine (NORVASC) 5 MG tablet TAKE ONE TABLET BY MOUTH EACH MORNING 30 tablet 11    ARMOUR THYROID 60 mg Tab TAKE ONE TABLET BY MOUTH EACH MORNING 30 tablet 5    busPIRone (BUSPAR) 15 MG tablet Take 15 mg by mouth once daily. Pt taking 1/3 of pill daily      losartan (COZAAR) 100 MG tablet Take 100 mg by mouth once daily.         Allergies  Review of patient's allergies indicates:   Allergen Reactions    Potassium chloride        Physical Examination     Vitals:    11/01/22 1019   BP: 132/76   Pulse: 73     Physical Exam  Constitutional:       Appearance: Normal appearance.   HENT:      Head: Normocephalic and atraumatic.      Right Ear: Tympanic membrane normal.      Left Ear: Tympanic membrane normal.      Nose: Nose normal.      Mouth/Throat:      Mouth: Mucous membranes are moist.   Eyes:      Extraocular  Movements: Extraocular movements intact.      Pupils: Pupils are equal, round, and reactive to light.   Cardiovascular:      Rate and Rhythm: Normal rate and regular rhythm.      Pulses: Normal pulses.   Pulmonary:      Effort: Pulmonary effort is normal.      Breath sounds: Normal breath sounds.   Abdominal:      General: Abdomen is flat. Bowel sounds are normal.      Palpations: Abdomen is soft.   Musculoskeletal:         General: Normal range of motion.      Cervical back: Normal range of motion and neck supple.   Skin:     General: Skin is warm and dry.   Neurological:      General: No focal deficit present.      Mental Status: She is alert and oriented to person, place, and time.   Psychiatric:         Mood and Affect: Mood normal.         Behavior: Behavior normal.        No flowsheet data found.  Fall Risk Assessment - Outpatient 11/1/2022   Mobility Status Ambulatory   Number of falls 2+   Identified as fall risk 1           Depression Screening  Over the past two weeks, has the patient felt down, depressed, or hopeless?: No  Over the past two weeks, has the patient felt little interest or pleasure in doing things?: No  Functional Ability/Safety Screening  Does the patient need help with phone, transportation, shopping, preparing meals, housework, laundry, meds, or managing money?: No  Does the patient's home have rugs in the hallway, lack grab bars in the bathroom, lack handrails on the stairs or have poor lighting?: No  Have you noticed any hearing difficulties?: No  Cognitive Function (Assessed through direct observation with due consideration of information obtained by way of patient reports and/or concerns raised by family, friends, caretakers, or others)    Does the patient repeat questions/statements in the same day?: No  Does the patient have trouble remembering the date, year, and time?: No  Does the patient have difficulty managing finances?: No  Does the patient have a decreased sense of  direction?: No    Assessment and Plan (including Health Maintenance)      Problem List  Smart Sets  Document Outside HM   :    Plan:   Wellness examination    Vitamin D deficiency    Osteopenia, unspecified location    Essential (primary) hypertension    Anxiety disorder, unspecified type    Hypothyroidism, unspecified type    Flu vaccine need  -     Influenza (FLUAD) - Quadrivalent (Adjuvanted) *Preferred* (65+) (PF)    Abnormal liver function test    Pain of right lower extremity     Discussed results all lab is stable   Referral to physical therapy made   Flu shot given   Revisit 1 year annual wellness    Orders Placed This Encounter    Influenza (FLUAD) - Quadrivalent (Adjuvanted) *Preferred* (65+) (PF)         Health Maintenance Due   Topic Date Due    Hepatitis C Screening  Never done    COVID-19 Vaccine (1) Never done    TETANUS VACCINE  Never done    Shingles Vaccine (1 of 2) Never done    Pneumococcal Vaccines (Age 65+) (1 - PCV) Never done    DEXA Scan  11/06/2020       Problem List Items Addressed This Visit          Psychiatric    Anxiety disorder, unspecified (Chronic)       Cardiac/Vascular    Essential (primary) hypertension (Chronic)       Endocrine    Vitamin D deficiency (Chronic)    Hypothyroidism, unspecified (Chronic)       GI    Abnormal liver function test       Orthopedic    Osteopenia (Chronic)     Other Visit Diagnoses       Wellness examination    -  Primary    Flu vaccine need        Pain of right lower extremity                Health Maintenance Topics with due status: Not Due       Topic Last Completion Date    Lipid Panel 10/18/2022       No future appointments.       Medicare Annual Wellness and Personalized Prevention Plan:   Fall Risk + Home Safety + Hearing Impairment + Depression Screen + Cognitive Impairment Screen + Health Risk Assessment all reviewed.         Advance Care Planning   I attest to discussing Advance Care Planning with patient and/or family member.  Education was  provided including the importance of the Health Care Power of , Advance Directives, and/or [place] documentation.  The patient expressed understanding to the importance of this information and discussion.       Opioid Screening: Patient medication list reviewed, patient is not taking prescription opioids. Patient is not using additional opioids than prescribed. Patient is at low risk of substance abuse based on this opioid use history.        Signature:  Blayne Cee MD  [facility]  [address] [place] [phone]    Date of encounter: 11/1/22    Follow up in about 1 year (around 11/1/2023) for Medicare Wellness. In addition to their scheduled follow up, the patient has also been instructed to follow up on as needed basis.

## 2022-11-02 DIAGNOSIS — F41.9 ANXIETY DISORDER, UNSPECIFIED TYPE: Primary | ICD-10-CM

## 2022-11-02 RX ORDER — BUSPIRONE HYDROCHLORIDE 15 MG/1
TABLET ORAL
Qty: 30 TABLET | Refills: 5 | Status: SHIPPED | OUTPATIENT
Start: 2022-11-02 | End: 2023-07-05

## 2023-07-05 DIAGNOSIS — F41.9 ANXIETY DISORDER, UNSPECIFIED TYPE: ICD-10-CM

## 2023-07-05 RX ORDER — BUSPIRONE HYDROCHLORIDE 15 MG/1
TABLET ORAL
Qty: 30 TABLET | Refills: 5 | Status: SHIPPED | OUTPATIENT
Start: 2023-07-05

## 2023-08-31 DIAGNOSIS — I10 ESSENTIAL (PRIMARY) HYPERTENSION: Primary | ICD-10-CM

## 2023-08-31 RX ORDER — AMLODIPINE BESYLATE 5 MG/1
TABLET ORAL
Qty: 30 TABLET | Refills: 11 | Status: SHIPPED | OUTPATIENT
Start: 2023-08-31

## 2023-09-12 DIAGNOSIS — E03.9 HYPOTHYROIDISM, UNSPECIFIED TYPE: Primary | ICD-10-CM

## 2023-09-12 RX ORDER — SULFAMETHOXAZOLE AND TRIMETHOPRIM 800; 160 MG/1; MG/1
TABLET ORAL
Qty: 30 TABLET | Refills: 11 | Status: SHIPPED | OUTPATIENT
Start: 2023-09-12

## 2023-11-22 ENCOUNTER — LAB VISIT (OUTPATIENT)
Dept: LAB | Facility: HOSPITAL | Age: 78
End: 2023-11-22
Attending: INTERNAL MEDICINE
Payer: MEDICARE

## 2023-11-22 DIAGNOSIS — E55.9 VITAMIN D DEFICIENCY: ICD-10-CM

## 2023-11-22 DIAGNOSIS — I10 ESSENTIAL (PRIMARY) HYPERTENSION: ICD-10-CM

## 2023-11-22 LAB
ALBUMIN SERPL-MCNC: 3.9 G/DL (ref 3.4–4.8)
ALBUMIN/GLOB SERPL: 1.2 RATIO (ref 1.1–2)
ALP SERPL-CCNC: 85 UNIT/L (ref 40–150)
ALT SERPL-CCNC: 20 UNIT/L (ref 0–55)
APPEARANCE UR: CLEAR
AST SERPL-CCNC: 15 UNIT/L (ref 5–34)
BACTERIA #/AREA URNS AUTO: NORMAL /HPF
BASOPHILS # BLD AUTO: 0.04 X10(3)/MCL
BASOPHILS NFR BLD AUTO: 0.8 %
BILIRUB SERPL-MCNC: 1 MG/DL
BILIRUB UR QL STRIP.AUTO: NEGATIVE
BUN SERPL-MCNC: 11.1 MG/DL (ref 9.8–20.1)
CALCIUM SERPL-MCNC: 9.8 MG/DL (ref 8.4–10.2)
CHLORIDE SERPL-SCNC: 102 MMOL/L (ref 98–107)
CHOLEST SERPL-MCNC: 206 MG/DL
CHOLEST/HDLC SERPL: 4 {RATIO} (ref 0–5)
CO2 SERPL-SCNC: 24 MMOL/L (ref 23–31)
COLOR UR AUTO: YELLOW
CREAT SERPL-MCNC: 0.81 MG/DL (ref 0.55–1.02)
DEPRECATED CALCIDIOL+CALCIFEROL SERPL-MC: 75.4 NG/ML (ref 30–80)
EOSINOPHIL # BLD AUTO: 0.35 X10(3)/MCL (ref 0–0.9)
EOSINOPHIL NFR BLD AUTO: 6.6 %
ERYTHROCYTE [DISTWIDTH] IN BLOOD BY AUTOMATED COUNT: 13.7 % (ref 11.5–17)
GFR SERPLBLD CREATININE-BSD FMLA CKD-EPI: >60 MLS/MIN/1.73/M2
GLOBULIN SER-MCNC: 3.3 GM/DL (ref 2.4–3.5)
GLUCOSE SERPL-MCNC: 95 MG/DL (ref 82–115)
GLUCOSE UR QL STRIP.AUTO: NEGATIVE
HCT VFR BLD AUTO: 47.6 % (ref 37–47)
HDLC SERPL-MCNC: 56 MG/DL (ref 35–60)
HGB BLD-MCNC: 14.9 G/DL (ref 12–16)
IMM GRANULOCYTES # BLD AUTO: 0.01 X10(3)/MCL (ref 0–0.04)
IMM GRANULOCYTES NFR BLD AUTO: 0.2 %
KETONES UR QL STRIP.AUTO: NEGATIVE
LDLC SERPL CALC-MCNC: 131 MG/DL (ref 50–140)
LEUKOCYTE ESTERASE UR QL STRIP.AUTO: ABNORMAL
LYMPHOCYTES # BLD AUTO: 1.46 X10(3)/MCL (ref 0.6–4.6)
LYMPHOCYTES NFR BLD AUTO: 27.4 %
MCH RBC QN AUTO: 27.4 PG (ref 27–31)
MCHC RBC AUTO-ENTMCNC: 31.3 G/DL (ref 33–36)
MCV RBC AUTO: 87.5 FL (ref 80–94)
MONOCYTES # BLD AUTO: 0.57 X10(3)/MCL (ref 0.1–1.3)
MONOCYTES NFR BLD AUTO: 10.7 %
NEUTROPHILS # BLD AUTO: 2.89 X10(3)/MCL (ref 2.1–9.2)
NEUTROPHILS NFR BLD AUTO: 54.3 %
NITRITE UR QL STRIP.AUTO: NEGATIVE
PH UR STRIP.AUTO: 7 [PH]
PLATELET # BLD AUTO: 311 X10(3)/MCL (ref 130–400)
PMV BLD AUTO: 8.9 FL (ref 7.4–10.4)
POTASSIUM SERPL-SCNC: 4.2 MMOL/L (ref 3.5–5.1)
PROT SERPL-MCNC: 7.2 GM/DL (ref 5.8–7.6)
PROT UR QL STRIP.AUTO: NEGATIVE
RBC # BLD AUTO: 5.44 X10(6)/MCL (ref 4.2–5.4)
RBC #/AREA URNS AUTO: NORMAL /HPF
RBC UR QL AUTO: NEGATIVE
SODIUM SERPL-SCNC: 136 MMOL/L (ref 136–145)
SP GR UR STRIP.AUTO: 1.01 (ref 1–1.03)
SQUAMOUS #/AREA URNS AUTO: NORMAL /HPF
TRIGL SERPL-MCNC: 96 MG/DL (ref 37–140)
UROBILINOGEN UR STRIP-ACNC: 0.2
VLDLC SERPL CALC-MCNC: 19 MG/DL
WBC # SPEC AUTO: 5.32 X10(3)/MCL (ref 4.5–11.5)
WBC #/AREA URNS AUTO: NORMAL /HPF

## 2023-11-22 PROCEDURE — 80053 COMPREHEN METABOLIC PANEL: CPT

## 2023-11-22 PROCEDURE — 80061 LIPID PANEL: CPT

## 2023-11-22 PROCEDURE — 85025 COMPLETE CBC W/AUTO DIFF WBC: CPT

## 2023-11-22 PROCEDURE — 82306 VITAMIN D 25 HYDROXY: CPT

## 2023-11-22 PROCEDURE — 36415 COLL VENOUS BLD VENIPUNCTURE: CPT

## 2023-11-22 PROCEDURE — 81001 URINALYSIS AUTO W/SCOPE: CPT

## 2023-11-30 ENCOUNTER — TELEPHONE (OUTPATIENT)
Dept: INTERNAL MEDICINE | Facility: CLINIC | Age: 78
End: 2023-11-30
Payer: MEDICARE

## 2023-12-04 PROBLEM — Z00.00 WELLNESS EXAMINATION: Status: ACTIVE | Noted: 2023-12-04

## 2023-12-05 ENCOUNTER — OFFICE VISIT (OUTPATIENT)
Dept: INTERNAL MEDICINE | Facility: CLINIC | Age: 78
End: 2023-12-05
Payer: MEDICARE

## 2023-12-05 VITALS
SYSTOLIC BLOOD PRESSURE: 130 MMHG | HEIGHT: 63 IN | OXYGEN SATURATION: 99 % | WEIGHT: 186.19 LBS | BODY MASS INDEX: 32.99 KG/M2 | HEART RATE: 88 BPM | DIASTOLIC BLOOD PRESSURE: 80 MMHG

## 2023-12-05 DIAGNOSIS — Z23 FLU VACCINE NEED: Primary | ICD-10-CM

## 2023-12-05 DIAGNOSIS — I10 ESSENTIAL (PRIMARY) HYPERTENSION: Chronic | ICD-10-CM

## 2023-12-05 DIAGNOSIS — Z00.00 WELLNESS EXAMINATION: ICD-10-CM

## 2023-12-05 DIAGNOSIS — E55.9 VITAMIN D DEFICIENCY: Chronic | ICD-10-CM

## 2023-12-05 DIAGNOSIS — E03.9 HYPOTHYROIDISM, UNSPECIFIED TYPE: Chronic | ICD-10-CM

## 2023-12-05 DIAGNOSIS — Z12.31 BREAST CANCER SCREENING BY MAMMOGRAM: ICD-10-CM

## 2023-12-05 PROCEDURE — 3079F PR MOST RECENT DIASTOLIC BLOOD PRESSURE 80-89 MM HG: ICD-10-PCS | Mod: CPTII,,, | Performed by: INTERNAL MEDICINE

## 2023-12-05 PROCEDURE — G0439 PR MEDICARE ANNUAL WELLNESS SUBSEQUENT VISIT: ICD-10-PCS | Mod: ,,, | Performed by: INTERNAL MEDICINE

## 2023-12-05 PROCEDURE — 1160F PR REVIEW ALL MEDS BY PRESCRIBER/CLIN PHARMACIST DOCUMENTED: ICD-10-PCS | Mod: CPTII,,, | Performed by: INTERNAL MEDICINE

## 2023-12-05 PROCEDURE — 3079F DIAST BP 80-89 MM HG: CPT | Mod: CPTII,,, | Performed by: INTERNAL MEDICINE

## 2023-12-05 PROCEDURE — 1159F PR MEDICATION LIST DOCUMENTED IN MEDICAL RECORD: ICD-10-PCS | Mod: CPTII,,, | Performed by: INTERNAL MEDICINE

## 2023-12-05 PROCEDURE — G0008 FLU VACCINE - QUADRIVALENT - ADJUVANTED: ICD-10-PCS | Mod: ,,, | Performed by: INTERNAL MEDICINE

## 2023-12-05 PROCEDURE — 3075F PR MOST RECENT SYSTOLIC BLOOD PRESS GE 130-139MM HG: ICD-10-PCS | Mod: CPTII,,, | Performed by: INTERNAL MEDICINE

## 2023-12-05 PROCEDURE — 1160F RVW MEDS BY RX/DR IN RCRD: CPT | Mod: CPTII,,, | Performed by: INTERNAL MEDICINE

## 2023-12-05 PROCEDURE — G0008 ADMIN INFLUENZA VIRUS VAC: HCPCS | Mod: ,,, | Performed by: INTERNAL MEDICINE

## 2023-12-05 PROCEDURE — 1126F PR PAIN SEVERITY QUANTIFIED, NO PAIN PRESENT: ICD-10-PCS | Mod: CPTII,,, | Performed by: INTERNAL MEDICINE

## 2023-12-05 PROCEDURE — 1126F AMNT PAIN NOTED NONE PRSNT: CPT | Mod: CPTII,,, | Performed by: INTERNAL MEDICINE

## 2023-12-05 PROCEDURE — 1101F PR PT FALLS ASSESS DOC 0-1 FALLS W/OUT INJ PAST YR: ICD-10-PCS | Mod: CPTII,,, | Performed by: INTERNAL MEDICINE

## 2023-12-05 PROCEDURE — 90694 FLU VACCINE - QUADRIVALENT - ADJUVANTED: ICD-10-PCS | Mod: ,,, | Performed by: INTERNAL MEDICINE

## 2023-12-05 PROCEDURE — G0439 PPPS, SUBSEQ VISIT: HCPCS | Mod: ,,, | Performed by: INTERNAL MEDICINE

## 2023-12-05 PROCEDURE — 1159F MED LIST DOCD IN RCRD: CPT | Mod: CPTII,,, | Performed by: INTERNAL MEDICINE

## 2023-12-05 PROCEDURE — 3075F SYST BP GE 130 - 139MM HG: CPT | Mod: CPTII,,, | Performed by: INTERNAL MEDICINE

## 2023-12-05 PROCEDURE — 90694 VACC AIIV4 NO PRSRV 0.5ML IM: CPT | Mod: ,,, | Performed by: INTERNAL MEDICINE

## 2023-12-05 PROCEDURE — 3288F PR FALLS RISK ASSESSMENT DOCUMENTED: ICD-10-PCS | Mod: CPTII,,, | Performed by: INTERNAL MEDICINE

## 2023-12-05 PROCEDURE — 3288F FALL RISK ASSESSMENT DOCD: CPT | Mod: CPTII,,, | Performed by: INTERNAL MEDICINE

## 2023-12-05 PROCEDURE — 1101F PT FALLS ASSESS-DOCD LE1/YR: CPT | Mod: CPTII,,, | Performed by: INTERNAL MEDICINE

## 2023-12-05 RX ORDER — CHOLECALCIFEROL (VITAMIN D3) 25 MCG
5000 TABLET ORAL DAILY
COMMUNITY

## 2023-12-05 NOTE — PROGRESS NOTES
Blayne Bautista MD        PATIENT NAME: Jen Fitzgerald  : 1945  DATE: 23  MRN: [id number]      Patient Care Team:  Blayne Bautista MD as PCP - General (Internal Medicine)  Blayne Bautista MD       Billing Provider: Blayne Bautista MD  Level of Service: [place] MEDICARE ANNUAL WELLNESS SUBSEQUENT VISIT  Patient PCP Information       Provider PCP Type    Blayne Bautista MD General            Reason for Visit / Chief Complaint: Medicare AWV (WELLNESS//)       Update PCP  Update Chief Complaint         History of Present Illness / Problem Focused Workflow     Jen Fitzgerald presents to the clinic with Medicare AWV (WELLNESS//)     Jen is here for her annual wellness exam  Medically she is not have any problems   She is grieving she lost her  of 67 years this last  due to cancer.        Review of Systems   Review of Systems   Constitutional: Negative.    HENT: Negative.     Eyes: Negative.    Respiratory: Negative.     Cardiovascular: Negative.    Gastrointestinal: Negative.    Endocrine: Negative.    Genitourinary: Negative.    Musculoskeletal: Negative.    Integumentary:  Negative.   Neurological: Negative.    Psychiatric/Behavioral: Negative.          Patient Reported Health Risk Assessment  What is your age?: 80 or older  Are you male or female?: Female  During the past four weeks, how much have you been bothered by emotional problems such as feeling anxious, depressed, irritable, sad, or downhearted and blue?: Not at all  During the past five weeks, has your physical and/or emotional health limited your social activities with family, friends, neighbors, or groups?: Not at all  During the past four weeks, how much bodily pain have you generally had?: No pain  During the past four weeks, was someone available to help if you needed and wanted help?: Yes, as much as I wanted  During the past four weeks, what was the hardest physical activity you could do for at least two  minutes?: Moderate  Can you get to places out of walking distance without help?  (For example, can you travel alone on buses or taxis, or drive your own car?): Yes  Can you go shopping for groceries or clothes without someone's help?: Yes  Can you prepare your own meals?: Yes  Can you do your own housework without help?: Yes  Because of any health problems, do you need the help of another person with your personal care needs such as eating, bathing, dressing, or getting around the house?: No  Can you handle your own money without help?: Yes  During the past four weeks, how would you rate your health in general?: Very good  How have things been going for you during the past four weeks?: Very well  Are you having difficulties driving your car?: No  Do you always fasten your seat belt when you are in a car?: Yes, usually  How often in the past four weeks have you been bothered by falling or dizzy when standing up?: Never  How often in the past four weeks have you been bothered by sexual problems?: Never  How often in the past four weeks have you been bothered by trouble eating well?: Never  How often in the past four weeks have you been bothered by teeth or denture problems?: Never  How often in the past four weeks have you been bothered with problems using the telephone?: Never  How often in the past four weeks have you been bothered by tiredness or fatigue?: Never  Have you fallen two or more times in the past year?: No  Are you afraid of falling?: No  Are you a smoker?: No  During the past four weeks, how many drinks of wine, beer, or other alcoholic beverages did you have?: No alcohol at all  Do you exercise for about 20 minutes three or more days a week?: Yes, some of the time  Have you been given any information to help you with hazards in your house that might hurt you?: Yes  Have you been given any information to help you with keeping track of your medications?: Yes  How often do you have trouble taking  medicines the way you've been told to take them?: I always take them as prescribed  How confident are you that you can control and manage most of your health problems?: Very confident  What is your race? (Check all that apply.):     Medical / Social / Family History     Past Medical History:   Diagnosis Date    Anxiety disorder, unspecified     Essential (primary) hypertension     Hypothyroidism, unspecified     Osteopenia     Vitamin D deficiency        Past Surgical History:   Procedure Laterality Date    APPENDECTOMY      CHOLECYSTECTOMY      TONSILLECTOMY         Social History  Ms. Fitzgerald  reports that she has never smoked. She has never used smokeless tobacco. She reports that she does not currently use alcohol. She reports that she does not use drugs.    Family History  Ms.'s Fitzgerald  family history includes Asthma in her mother; Cancer in her father; Diabetes in her father; Hypertension in her mother.        Medications and Allergies     Medications  Outpatient Medications Marked as Taking for the 12/5/23 encounter (Office Visit) with Blayne Bautista MD   Medication Sig Dispense Refill    amLODIPine (NORVASC) 5 MG tablet TAKE ONE TABLET BY MOUTH EACH MORNING 30 tablet 11    ARMOUR THYROID 60 mg Tab TAKE ONE TABLET BY MOUTH EACH MORNING 30 tablet 11    busPIRone (BUSPAR) 15 MG tablet TAKE ONE TABLET BY MOUTH DAILY 30 tablet 5    losartan (COZAAR) 100 MG tablet TAKE ONE TABLET BY MOUTH DAILY 30 tablet 12    vitamin D (VITAMIN D3) 1000 units Tab Take 5,000 Units by mouth once daily.         Allergies  Review of patient's allergies indicates:   Allergen Reactions    Potassium chloride        Physical Examination     Vitals:    12/05/23 1015   BP: 130/80   Pulse:      Physical Exam  Constitutional:       Appearance: Normal appearance.   HENT:      Head: Normocephalic and atraumatic.      Right Ear: Tympanic membrane normal.      Left Ear: Tympanic membrane normal.      Nose: Nose normal.       Mouth/Throat:      Mouth: Mucous membranes are moist.   Eyes:      Extraocular Movements: Extraocular movements intact.      Pupils: Pupils are equal, round, and reactive to light.   Cardiovascular:      Rate and Rhythm: Normal rate and regular rhythm.      Pulses: Normal pulses.   Pulmonary:      Effort: Pulmonary effort is normal.      Breath sounds: Normal breath sounds.   Abdominal:      General: Abdomen is flat. Bowel sounds are normal.      Palpations: Abdomen is soft.   Musculoskeletal:         General: Normal range of motion.      Cervical back: Normal range of motion and neck supple.   Skin:     General: Skin is warm and dry.   Neurological:      General: No focal deficit present.      Mental Status: She is alert and oriented to person, place, and time.   Psychiatric:         Mood and Affect: Mood normal.         Behavior: Behavior normal.               No data to display                  12/5/2023    10:00 AM 11/1/2022    10:20 AM   Fall Risk Assessment - Outpatient   Mobility Status Ambulatory Ambulatory   Number of falls 0 2+   Identified as fall risk False True           Depression Screening  Over the past two weeks, has the patient felt down, depressed, or hopeless?: No  Over the past two weeks, has the patient felt little interest or pleasure in doing things?: No  Functional Ability/Safety Screening  Was the patient's timed Up & Go test unsteady or longer than 30 seconds?: No  Does the patient need help with phone, transportation, shopping, preparing meals, housework, laundry, meds, or managing money?: No  Does the patient's home have rugs in the hallway, lack grab bars in the bathroom, lack handrails on the stairs or have poor lighting?: No  Have you noticed any hearing difficulties?: No  Cognitive Function (Assessed through direct observation with due consideration of information obtained by way of patient reports and/or concerns raised by family, friends, caretakers, or others)    Does the patient  repeat questions/statements in the same day?: No  Does the patient have trouble remembering the date, year, and time?: No  Does the patient have difficulty managing finances?: No  Does the patient have a decreased sense of direction?: No    Assessment and Plan (including Health Maintenance)      Problem List  Smart Sets  Document Outside HM   :    Plan:       ICD-10-CM ICD-9-CM   1. Wellness examination  Z00.00 V70.0   2. Essential (primary) hypertension  I10 401.9   3. Hypothyroidism, unspecified type  E03.9 244.9   4. Vitamin D deficiency  E55.9 268.9   5. Breast cancer screening by mammogram  Z12.31 V76.12               Health Maintenance Due   Topic Date Due    COVID-19 Vaccine (1) Never done    TETANUS VACCINE  Never done    Shingles Vaccine (1 of 2) Never done    RSV Vaccine (Age 60+ and Pregnant patients) (1 - 1-dose 60+ series) Never done    Pneumococcal Vaccines (Age 65+) (1 - PCV) Never done    DEXA Scan  11/06/2020    Influenza Vaccine (1) 09/01/2023       Problem List Items Addressed This Visit          Cardiac/Vascular    Essential (primary) hypertension (Chronic)    Current Assessment & Plan     Blood pressure stable continue medication            Endocrine    Vitamin D deficiency (Chronic)    Current Assessment & Plan     Vitamin-D level is good         Hypothyroidism, unspecified (Chronic)    Current Assessment & Plan     Condition stable continue Coupland thyroid            Other    Wellness examination - Primary    Current Assessment & Plan     Discussed all results   Mammogram scheduled.          Other Visit Diagnoses       Breast cancer screening by mammogram                Health Maintenance Topics with due status: Not Due       Topic Last Completion Date    Lipid Panel 11/22/2023       No future appointments.       Medicare Annual Wellness and Personalized Prevention Plan:   Fall Risk + Home Safety + Hearing Impairment + Depression Screen + Cognitive Impairment Screen + Health Risk Assessment all  reviewed.         Advance Care Planning   I attest to discussing Advance Care Planning with patient and/or family member.  Education was provided including the importance of the Health Care Power of , Advance Directives, and/or [place] documentation.  The patient expressed understanding to the importance of this information and discussion.       Opioid Screening: Patient medication list reviewed, patient is not taking prescription opioids. Patient is not using additional opioids than prescribed. Patient is at low risk of substance abuse based on this opioid use history.        Signature:  Blayne Cee MD  [facility]  [address] [place] [phone]    Date of encounter: 12/5/23    Follow up in about 1 year (around 12/5/2024) for Medicare Wellness with labs. In addition to their scheduled follow up, the patient has also been instructed to follow up on as needed basis.

## 2023-12-27 ENCOUNTER — HOSPITAL ENCOUNTER (OUTPATIENT)
Dept: RADIOLOGY | Facility: HOSPITAL | Age: 78
Discharge: HOME | End: 2023-12-27
Attending: FAMILY MEDICINE
Payer: MEDICARE

## 2023-12-27 DIAGNOSIS — M25.562 LEFT KNEE PAIN: Primary | ICD-10-CM

## 2023-12-27 DIAGNOSIS — M25.562 LEFT KNEE PAIN: ICD-10-CM

## 2023-12-27 PROCEDURE — 73562 X-RAY EXAM OF KNEE 3: CPT | Mod: TC,LT

## 2024-01-09 DIAGNOSIS — M25.562 LEFT KNEE PAIN: Primary | ICD-10-CM

## 2024-01-17 ENCOUNTER — OFFICE VISIT (OUTPATIENT)
Dept: ORTHOPEDICS | Facility: CLINIC | Age: 79
End: 2024-01-17
Payer: MEDICARE

## 2024-01-17 DIAGNOSIS — M84.362A STRESS FRACTURE OF LEFT TIBIA, INITIAL ENCOUNTER: ICD-10-CM

## 2024-01-17 DIAGNOSIS — M25.562 LEFT KNEE PAIN: ICD-10-CM

## 2024-01-17 DIAGNOSIS — M17.12 PRIMARY OSTEOARTHRITIS OF LEFT KNEE: Primary | ICD-10-CM

## 2024-01-17 PROCEDURE — 1160F RVW MEDS BY RX/DR IN RCRD: CPT | Mod: CPTII,,, | Performed by: ORTHOPAEDIC SURGERY

## 2024-01-17 PROCEDURE — 99204 OFFICE O/P NEW MOD 45 MIN: CPT | Mod: ,,, | Performed by: ORTHOPAEDIC SURGERY

## 2024-01-17 PROCEDURE — 1159F MED LIST DOCD IN RCRD: CPT | Mod: CPTII,,, | Performed by: ORTHOPAEDIC SURGERY

## 2024-01-17 NOTE — PROGRESS NOTES
Subjective:    CC: Pain of the Left Knee (Left knee pain. Pt states pain has eased up over the last week. Pt fell 12/9/23 and didn't start hurting until 8 days later. Felt sore after the fall. Pt states it got to the point to where she couldn't stand at all. No swelling. Going to PT. MRI on disc)       HPI:  Patient comes in today complaining of left knee pain.  Patient states she had a fall 5 weeks ago, started hurting about 4 weeks ago.  She has been going to physical therapy, slowly improving.  She did have an MRI 2 weeks ago.  She has using a cane.  She is presently with family.  She denies any previous injuries she denies other complaints.    ROS: Refer to HPI for pertinent ROS. All other 12 point systems negative.    Objective:  There were no vitals filed for this visit.     Physical Exam:  The patient is well-nourished, well-developed and in no apparent distress, pleasant and cooperative. Examination of the left lower extremity compartments are soft and warm. Skin is intact. There are no signs or symptoms of DVT or infection. There is no obvious joint effusion. There is no erythema. Tender to palpation along the medial joint line , left knee range of motion is 0-110. The knee is stable to exam with varus and valgus stressing. Negative anterior and posterior drawer. Negative Lachman´s.  Questionable Anny's test. Patella grind is positive, Negative for apprehension. Neurovascularly intact distally.    Images:  X-rays three views left knee demonstrate no obvious fracture dislocation, outside MRI was reviewed. Images Reviewed and discussed with patient.    Assessment:  1. Left knee pain  - Ambulatory referral/consult to Orthopedics    2. Primary osteoarthritis of left knee    3. Stress fracture of left tibia, initial encounter        Plan:  at this time we discussed her physical exam and imaging findings.  We have discussed various treatment options.  We will continue conservative treatment.  We have  discussed continuing physical therapy, offloading her left leg with her cane, letting pain be her guide.  We have discussed anti-inflammatories with appropriate precautions.  I would like see back in 4 weeks repeat exam including x-rays.    Follow UP: No follow-ups on file.

## 2024-02-19 ENCOUNTER — OFFICE VISIT (OUTPATIENT)
Dept: ORTHOPEDICS | Facility: CLINIC | Age: 79
End: 2024-02-19
Payer: MEDICARE

## 2024-02-19 DIAGNOSIS — M84.362A STRESS FRACTURE OF LEFT TIBIA, INITIAL ENCOUNTER: Primary | ICD-10-CM

## 2024-02-19 DIAGNOSIS — M25.562 LEFT KNEE PAIN, UNSPECIFIED CHRONICITY: ICD-10-CM

## 2024-02-19 DIAGNOSIS — M17.0 PRIMARY OSTEOARTHRITIS OF BOTH KNEES: ICD-10-CM

## 2024-02-19 DIAGNOSIS — M25.561 ACUTE PAIN OF RIGHT KNEE: ICD-10-CM

## 2024-02-19 PROCEDURE — 1160F RVW MEDS BY RX/DR IN RCRD: CPT | Mod: CPTII,,, | Performed by: ORTHOPAEDIC SURGERY

## 2024-02-19 PROCEDURE — 3288F FALL RISK ASSESSMENT DOCD: CPT | Mod: CPTII,,, | Performed by: ORTHOPAEDIC SURGERY

## 2024-02-19 PROCEDURE — 1159F MED LIST DOCD IN RCRD: CPT | Mod: CPTII,,, | Performed by: ORTHOPAEDIC SURGERY

## 2024-02-19 PROCEDURE — 1101F PT FALLS ASSESS-DOCD LE1/YR: CPT | Mod: CPTII,,, | Performed by: ORTHOPAEDIC SURGERY

## 2024-02-19 PROCEDURE — 99214 OFFICE O/P EST MOD 30 MIN: CPT | Mod: ,,, | Performed by: ORTHOPAEDIC SURGERY

## 2024-02-19 NOTE — PROGRESS NOTES
Subjective:    CC: Follow-up of the Left Knee (F/U for left knee pain. Pt states knee has gotten a lot better. Her strength is back and is able to bend/extend it all the way. ) and Pain of the Right Knee (Right knee pain. Has pain behind knee and in calf. Has a lot of weakness and can't move it much when standing up. Mainly has pain when walking. Calf pulls when laying down. No swelling. No other concerns with it. )       HPI:  Patient returns today for repeat exam.  Patient has a history of a stress fracture of her left knee, she states it is feeling much better.  She states now she has been having some pulling and pain in her right knee, she states she would like to go back to physical therapy, it has helped in the past.  She denies any swelling she denies any trauma she denies any numbness or tingling or other complaints.    ROS: Refer to HPI for pertinent ROS. All other 12 point systems negative.    Objective:  There were no vitals filed for this visit.     Physical Exam:  The patient is well-nourished, well-developed, in no apparent distress, pleasant and cooperative. Examination of the bilateral lower extremities,  compartments are soft and warm. Skin is intact. There are no signs or symptoms of DVT or infection. There is no obvious effusion. There is no erythema. Tenderness to palpation along the anterior aspect of the left knee, posterior aspect of the right, right knee range of motion is 0-110; left knee range of motion is 0-105. The knee is stable to stressing with varus and valgus. Negative anterior and posterior drawer.   Negative Lachman´s.  Negative Anny's test. Patella grind is positive, negative for apprehension.  Patient is neurovascularly intact distally.      Images:  X-rays three views of the left and right knee demonstrates arthritic changes. Images Reviewed and discussed with patient.    Assessment:  1. Left knee pain, unspecified chronicity  - X-Ray Knee 3 View Left; Future    2. Acute  pain of right knee  - X-Ray Knee 3 View Right; Future    3. Stress fracture of left tibia, initial encounter  - Ambulatory referral/consult to Physical/Occupational Therapy; Future    4. Primary osteoarthritis of both knees  - Ambulatory referral/consult to Physical/Occupational Therapy; Future        Plan:  At this time we discussed her physical exam and x-ray findings.  We have discussed various treatment options.  We will proceed with physical therapy, this has helped a lot in the past.  I would like see back in 4 weeks to see how she is progressing.  I believe she is healed her fracture nicely.    Follow UP: No follow-ups on file.

## 2024-03-04 PROBLEM — Z00.00 WELLNESS EXAMINATION: Status: RESOLVED | Noted: 2023-12-04 | Resolved: 2024-03-04

## 2024-04-09 DIAGNOSIS — I10 ESSENTIAL (PRIMARY) HYPERTENSION: Chronic | ICD-10-CM

## 2024-04-09 RX ORDER — LOSARTAN POTASSIUM 100 MG/1
TABLET ORAL
Qty: 30 TABLET | Refills: 12 | Status: SHIPPED | OUTPATIENT
Start: 2024-04-09

## 2024-04-10 ENCOUNTER — OFFICE VISIT (OUTPATIENT)
Dept: ORTHOPEDICS | Facility: CLINIC | Age: 79
End: 2024-04-10
Payer: MEDICARE

## 2024-04-10 DIAGNOSIS — M84.362A STRESS FRACTURE OF LEFT TIBIA, INITIAL ENCOUNTER: Primary | ICD-10-CM

## 2024-04-10 DIAGNOSIS — M17.0 PRIMARY OSTEOARTHRITIS OF BOTH KNEES: ICD-10-CM

## 2024-04-10 PROCEDURE — 1125F AMNT PAIN NOTED PAIN PRSNT: CPT | Mod: CPTII,,, | Performed by: ORTHOPAEDIC SURGERY

## 2024-04-10 PROCEDURE — 3288F FALL RISK ASSESSMENT DOCD: CPT | Mod: CPTII,,, | Performed by: ORTHOPAEDIC SURGERY

## 2024-04-10 PROCEDURE — 1100F PTFALLS ASSESS-DOCD GE2>/YR: CPT | Mod: CPTII,,, | Performed by: ORTHOPAEDIC SURGERY

## 2024-04-10 PROCEDURE — 1159F MED LIST DOCD IN RCRD: CPT | Mod: CPTII,,, | Performed by: ORTHOPAEDIC SURGERY

## 2024-04-10 PROCEDURE — 1160F RVW MEDS BY RX/DR IN RCRD: CPT | Mod: CPTII,,, | Performed by: ORTHOPAEDIC SURGERY

## 2024-04-10 PROCEDURE — 99213 OFFICE O/P EST LOW 20 MIN: CPT | Mod: ,,, | Performed by: ORTHOPAEDIC SURGERY

## 2024-04-10 RX ORDER — LATANOPROST 50 UG/ML
1 SOLUTION/ DROPS OPHTHALMIC NIGHTLY
COMMUNITY
Start: 2024-03-11

## 2024-04-11 NOTE — PROGRESS NOTES
Subjective:    CC: Follow-up of the Left Knee (F/u Lt knee pain, pt states knee has more of a tiredness than a pain, not taking pain meds. Using essential oils for pain.)       HPI:  Today for repeat exam.  Patient states her left knee feels much better.  She states her right knee is also improving, she has not taking any pain medications, she denies other complaints, she has for the most part finished physical therapy, she has discussed wellness program    ROS: Refer to HPI for pertinent ROS. All other 12 point systems negative.    Objective:  There were no vitals filed for this visit.     Physical Exam:  The patient is well-nourished, well-developed, in no apparent distress, pleasant and cooperative. Examination of the bilateral lower extremities,  compartments are soft and warm. Skin is intact. There are no signs or symptoms of DVT or infection. There is no obvious joint effusion. There is no erythema. Tenderness to palpation along the anterior aspect, right knee range of motion is 0-110; left knee range of motion is 0-105. The knee is stable to stressing with varus and valgus. Negative anterior and posterior drawer.   Negative Lachman´s.  Negative Anny's test. Patella grind is positive, negative for apprehension.  Patient is neurovascularly intact distally.      Images: . Images Reviewed and discussed with patient.    Assessment:  1. Stress fracture of left tibia, initial encounter    2. Primary osteoarthritis of both knees        Plan:  At this time we discussed her physical exam and previous imaging findings.  She will continue with low-impact activities, knee exercises, she is back to her normal activities.  I would like see back with any problems or difficulties.    Follow UP: No follow-ups on file.

## 2024-05-29 ENCOUNTER — OFFICE VISIT (OUTPATIENT)
Dept: ORTHOPEDICS | Facility: CLINIC | Age: 79
End: 2024-05-29
Payer: MEDICARE

## 2024-05-29 DIAGNOSIS — M17.12 PRIMARY OSTEOARTHRITIS OF LEFT KNEE: ICD-10-CM

## 2024-05-29 DIAGNOSIS — M84.362A STRESS FRACTURE OF LEFT TIBIA, INITIAL ENCOUNTER: ICD-10-CM

## 2024-05-29 DIAGNOSIS — M25.562 LEFT KNEE PAIN, UNSPECIFIED CHRONICITY: Primary | ICD-10-CM

## 2024-05-29 PROCEDURE — 99213 OFFICE O/P EST LOW 20 MIN: CPT | Mod: ,,, | Performed by: ORTHOPAEDIC SURGERY

## 2024-05-29 PROCEDURE — 1159F MED LIST DOCD IN RCRD: CPT | Mod: CPTII,,, | Performed by: ORTHOPAEDIC SURGERY

## 2024-05-29 PROCEDURE — 1160F RVW MEDS BY RX/DR IN RCRD: CPT | Mod: CPTII,,, | Performed by: ORTHOPAEDIC SURGERY

## 2024-05-30 NOTE — PROGRESS NOTES
Subjective:    CC: Pain of the Left Knee (F/U for left knee pain. Pt states pain radiates down to ankle and makes leg weak and gives out. Had injection last week. Gave out on her on 5/27/24 but didn't fall. Pt states she fell 6 months ago and wants to discuss further options due to pain/knee not getting better.)       HPI:  Returns today for repeat exam.  Patient states she continues to have pain about her left knee.  She is over 6 months out from her initial injury.  We have discussed her previous MRI of a stress fracture.  Patient feels like her leg is giving out, though she did not fall.  She does ambulate with her walker.    ROS: Refer to HPI for pertinent ROS. All other 12 point systems negative.    Objective:  There were no vitals filed for this visit.     Physical Exam:  Left lower extremity compartment soft and warm.  Skin is intact.  There is no signs symptoms of DVT or infection.  She does have some tenderness along the anterior knee, there was no obvious joint effusion.  Her motion today is 0-100 degrees she is otherwise stable to varus and valgus stressing negative anterior-posterior drawer negative Lachman's negative Anny's.  She walks with a hesitant gait, neurovascular intact distally.    Images:  X-rays three views left knee demonstrates arthritic changes. Images Reviewed and discussed with patient.    Assessment:  1. Left knee pain, unspecified chronicity  - X-Ray Knee 3 View Left; Future  - MRI Knee Without Contrast Left; Future  - MRI Knee Without Contrast Left    2. Stress fracture of left tibia, initial encounter  - MRI Knee Without Contrast Left; Future  - MRI Knee Without Contrast Left    3. Primary osteoarthritis of left knee  - MRI Knee Without Contrast Left; Future  - MRI Knee Without Contrast Left        Plan:  At this time we discussed her physical exam and previous MRI findings.  We have discussed various treatment options.  We have discussed her previous stress fracture.  We will  plan to repeat her MRI, she remains be symptomatic, I would like see her back later this week for her results.    Follow UP: No follow-ups on file.

## 2024-06-03 ENCOUNTER — OFFICE VISIT (OUTPATIENT)
Dept: ORTHOPEDICS | Facility: CLINIC | Age: 79
End: 2024-06-03
Payer: MEDICARE

## 2024-06-03 DIAGNOSIS — S83.8X2A ACUTE LATERAL MENISCAL INJURY OF LEFT KNEE, INITIAL ENCOUNTER: ICD-10-CM

## 2024-06-03 DIAGNOSIS — M17.12 PRIMARY OSTEOARTHRITIS OF LEFT KNEE: ICD-10-CM

## 2024-06-03 DIAGNOSIS — M23.204 DEGENERATIVE TEAR OF LEFT MEDIAL MENISCUS: Primary | ICD-10-CM

## 2024-06-03 PROCEDURE — 1160F RVW MEDS BY RX/DR IN RCRD: CPT | Mod: CPTII,,, | Performed by: ORTHOPAEDIC SURGERY

## 2024-06-03 PROCEDURE — 1159F MED LIST DOCD IN RCRD: CPT | Mod: CPTII,,, | Performed by: ORTHOPAEDIC SURGERY

## 2024-06-03 PROCEDURE — 99213 OFFICE O/P EST LOW 20 MIN: CPT | Mod: ,,, | Performed by: ORTHOPAEDIC SURGERY

## 2024-06-11 ENCOUNTER — TELEPHONE (OUTPATIENT)
Dept: ORTHOPEDICS | Facility: CLINIC | Age: 79
End: 2024-06-11
Payer: MEDICARE

## 2024-06-11 NOTE — TELEPHONE ENCOUNTER
Naty from [facility] called to get her order faxed to them.     Faxed via epic faxing to [phone]

## 2024-07-31 ENCOUNTER — OFFICE VISIT (OUTPATIENT)
Dept: ORTHOPEDICS | Facility: CLINIC | Age: 79
End: 2024-07-31
Payer: MEDICARE

## 2024-07-31 DIAGNOSIS — M23.204 DEGENERATIVE TEAR OF LEFT MEDIAL MENISCUS: Primary | ICD-10-CM

## 2024-07-31 DIAGNOSIS — M17.12 PRIMARY OSTEOARTHRITIS OF LEFT KNEE: ICD-10-CM

## 2024-07-31 DIAGNOSIS — M23.302: ICD-10-CM

## 2024-07-31 PROCEDURE — 1159F MED LIST DOCD IN RCRD: CPT | Mod: CPTII,,,

## 2024-07-31 PROCEDURE — 1160F RVW MEDS BY RX/DR IN RCRD: CPT | Mod: CPTII,,,

## 2024-07-31 PROCEDURE — 99213 OFFICE O/P EST LOW 20 MIN: CPT | Mod: ,,,

## 2024-07-31 NOTE — PROGRESS NOTES
Subjective:    CC: Follow-up and Pain of the Left Knee (F/U for L knee pain. Pt states knee is doing a lot better. Has a sensitive spot but is going away and not as painful. No other concerns with it.)       HPI:  Patient presents to clinic for repeat evaluation of left knee.  She states her knee is significantly better.  She did complete a course of physical therapy which she has since finished.  She states this helped a lot.  She does still have some slight pain about the medial knee but is well managed today.  We will intermittently use Tylenol when needed.  Patient states she does not tolerate other NSAIDs well.  Ambulating unassisted today.     ROS: Refer to HPI for pertinent ROS. All other 12 point systems negative.    Past Medical History:   Diagnosis Date    Anxiety disorder, unspecified     Essential (primary) hypertension     Hypothyroidism, unspecified     Osteopenia     Vitamin D deficiency         Past Surgical History:   Procedure Laterality Date    APPENDECTOMY      CHOLECYSTECTOMY      HYSTERECTOMY      TONSILLECTOMY      TONSILLECTOMY          Current Outpatient Medications on File Prior to Visit   Medication Sig Dispense Refill    amLODIPine (NORVASC) 5 MG tablet TAKE ONE TABLET BY MOUTH EACH MORNING 30 tablet 11    ARMOUR THYROID 60 mg Tab TAKE ONE TABLET BY MOUTH EACH MORNING 30 tablet 11    busPIRone (BUSPAR) 15 MG tablet TAKE ONE TABLET BY MOUTH DAILY 30 tablet 5    latanoprost 0.005 % ophthalmic solution Place 1 drop into both eyes every evening.      losartan (COZAAR) 100 MG tablet TAKE ONE TABLET BY MOUTH DAILY 30 tablet 12    vitamin D (VITAMIN D3) 1000 units Tab Take 5,000 Units by mouth once daily.       No current facility-administered medications on file prior to visit.        Review of patient's allergies indicates:   Allergen Reactions    Potassium chloride Other (See Comments)     Passes out       Objective:    There were no vitals filed for this visit.     Physical Exam:  The  patient is well-nourished, well-developed and in no apparent distress, pleasant and cooperative. Examination of the left lower extremity compartments are soft and warm. Skin is intact. There are no signs or symptoms of DVT or infection. There is no joint effusion. There is no erythema. Tender to palpation along the anteromedial joint line , left knee range of motion is 0-110 degrees. The knee is stable to exam with varus and valgus stressing. Negative anterior and posterior drawer. Negative Lachman´s.  Positive medial Anny's test. Patella grind is positive, Negative for apprehension. Neurovascularly intact distally.    Images:  Previous Images Reviewed and discussed with patient.    Assessment:  1. Degenerative tear of left medial meniscus    2. Primary osteoarthritis of left knee    3. Degenerative tear of lateral meniscus, unspecified laterality       Plan:  Physical exam and previous MRI and x-ray images again discussed and reviewed with the patient.  She is overall much improved today.  Patient was offered a steroid injection today however she states her pain is not bad enough for this at this time.  We have discussed low-impact activities, continued home exercise program.  Patient also attends the wellness program.  We have discussed prescription anti-inflammatories however she states she would like to hold off on knees.  I would like to see the patient back with any problems or difficulties.    Follow up: Follow up if symptoms worsen or fail to improve.             dry mouth dry mouth dry mouth dry mouth dry mouth dry mouth

## 2024-08-18 DIAGNOSIS — E03.9 HYPOTHYROIDISM, UNSPECIFIED TYPE: ICD-10-CM

## 2024-08-19 RX ORDER — SULFAMETHOXAZOLE AND TRIMETHOPRIM 800; 160 MG/1; MG/1
TABLET ORAL
Qty: 30 TABLET | Refills: 11 | Status: SHIPPED | OUTPATIENT
Start: 2024-08-19

## 2024-08-21 DIAGNOSIS — I10 ESSENTIAL (PRIMARY) HYPERTENSION: ICD-10-CM

## 2024-08-21 RX ORDER — AMLODIPINE BESYLATE 5 MG/1
TABLET ORAL
Qty: 30 TABLET | Refills: 11 | Status: SHIPPED | OUTPATIENT
Start: 2024-08-21

## 2024-12-09 ENCOUNTER — TELEPHONE (OUTPATIENT)
Dept: INTERNAL MEDICINE | Facility: CLINIC | Age: 79
End: 2024-12-09
Payer: MEDICARE

## 2024-12-09 NOTE — TELEPHONE ENCOUNTER
Patient has an appointment on 12/16  Fasting labs NOT DONE  Please call and remind patient of appointment

## 2024-12-10 ENCOUNTER — LAB VISIT (OUTPATIENT)
Dept: LAB | Facility: HOSPITAL | Age: 79
End: 2024-12-10
Attending: INTERNAL MEDICINE
Payer: MEDICARE

## 2024-12-10 DIAGNOSIS — E55.9 VITAMIN D DEFICIENCY: Chronic | ICD-10-CM

## 2024-12-10 DIAGNOSIS — I10 ESSENTIAL (PRIMARY) HYPERTENSION: ICD-10-CM

## 2024-12-10 DIAGNOSIS — E03.9 HYPOTHYROIDISM, UNSPECIFIED TYPE: Chronic | ICD-10-CM

## 2024-12-10 LAB
25(OH)D3+25(OH)D2 SERPL-MCNC: 71 NG/ML (ref 30–80)
ALBUMIN SERPL-MCNC: 3.3 G/DL (ref 3.4–4.8)
ALBUMIN/GLOB SERPL: 0.9 RATIO (ref 1.1–2)
ALP SERPL-CCNC: 94 UNIT/L (ref 40–150)
ALT SERPL-CCNC: 13 UNIT/L (ref 0–55)
ANION GAP SERPL CALC-SCNC: 8 MEQ/L
AST SERPL-CCNC: 16 UNIT/L (ref 5–34)
BASOPHILS # BLD AUTO: 0.04 X10(3)/MCL
BASOPHILS NFR BLD AUTO: 0.5 %
BILIRUB SERPL-MCNC: 0.8 MG/DL
BUN SERPL-MCNC: 8.7 MG/DL (ref 9.8–20.1)
CALCIUM SERPL-MCNC: 10 MG/DL (ref 8.4–10.2)
CHLORIDE SERPL-SCNC: 107 MMOL/L (ref 98–107)
CHOLEST SERPL-MCNC: 171 MG/DL
CHOLEST/HDLC SERPL: 4 {RATIO} (ref 0–5)
CO2 SERPL-SCNC: 26 MMOL/L (ref 23–31)
CREAT SERPL-MCNC: 0.78 MG/DL (ref 0.55–1.02)
CREAT/UREA NIT SERPL: 11
EOSINOPHIL # BLD AUTO: 0.21 X10(3)/MCL (ref 0–0.9)
EOSINOPHIL NFR BLD AUTO: 2.8 %
ERYTHROCYTE [DISTWIDTH] IN BLOOD BY AUTOMATED COUNT: 13.5 % (ref 11.5–17)
GFR SERPLBLD CREATININE-BSD FMLA CKD-EPI: >60 ML/MIN/1.73/M2
GLOBULIN SER-MCNC: 3.8 GM/DL (ref 2.4–3.5)
GLUCOSE SERPL-MCNC: 94 MG/DL (ref 82–115)
HCT VFR BLD AUTO: 44.6 % (ref 37–47)
HDLC SERPL-MCNC: 40 MG/DL (ref 35–60)
HGB BLD-MCNC: 14.4 G/DL (ref 12–16)
IMM GRANULOCYTES # BLD AUTO: 0.02 X10(3)/MCL (ref 0–0.04)
IMM GRANULOCYTES NFR BLD AUTO: 0.3 %
LDLC SERPL CALC-MCNC: 115 MG/DL (ref 50–140)
LYMPHOCYTES # BLD AUTO: 2.62 X10(3)/MCL (ref 0.6–4.6)
LYMPHOCYTES NFR BLD AUTO: 34.7 %
MCH RBC QN AUTO: 27.3 PG (ref 27–31)
MCHC RBC AUTO-ENTMCNC: 32.3 G/DL (ref 33–36)
MCV RBC AUTO: 84.5 FL (ref 80–94)
MONOCYTES # BLD AUTO: 0.49 X10(3)/MCL (ref 0.1–1.3)
MONOCYTES NFR BLD AUTO: 6.5 %
NEUTROPHILS # BLD AUTO: 4.18 X10(3)/MCL (ref 2.1–9.2)
NEUTROPHILS NFR BLD AUTO: 55.2 %
PLATELET # BLD AUTO: 383 X10(3)/MCL (ref 130–400)
PMV BLD AUTO: 8.6 FL (ref 7.4–10.4)
POTASSIUM SERPL-SCNC: 4.5 MMOL/L (ref 3.5–5.1)
PROT SERPL-MCNC: 7.1 GM/DL (ref 5.8–7.6)
RBC # BLD AUTO: 5.28 X10(6)/MCL (ref 4.2–5.4)
SODIUM SERPL-SCNC: 141 MMOL/L (ref 136–145)
TRIGL SERPL-MCNC: 78 MG/DL (ref 37–140)
TSH SERPL-ACNC: 1.55 UIU/ML (ref 0.35–4.94)
VLDLC SERPL CALC-MCNC: 16 MG/DL
WBC # BLD AUTO: 7.56 X10(3)/MCL (ref 4.5–11.5)

## 2024-12-10 PROCEDURE — 82306 VITAMIN D 25 HYDROXY: CPT

## 2024-12-10 PROCEDURE — 36415 COLL VENOUS BLD VENIPUNCTURE: CPT

## 2024-12-10 PROCEDURE — 80053 COMPREHEN METABOLIC PANEL: CPT

## 2024-12-10 PROCEDURE — 80061 LIPID PANEL: CPT

## 2024-12-10 PROCEDURE — 85025 COMPLETE CBC W/AUTO DIFF WBC: CPT

## 2024-12-10 PROCEDURE — 84443 ASSAY THYROID STIM HORMONE: CPT

## 2024-12-16 ENCOUNTER — OFFICE VISIT (OUTPATIENT)
Dept: INTERNAL MEDICINE | Facility: CLINIC | Age: 79
End: 2024-12-16
Payer: MEDICARE

## 2024-12-16 VITALS
DIASTOLIC BLOOD PRESSURE: 66 MMHG | OXYGEN SATURATION: 97 % | SYSTOLIC BLOOD PRESSURE: 134 MMHG | HEART RATE: 76 BPM | BODY MASS INDEX: 32.76 KG/M2 | HEIGHT: 62 IN | WEIGHT: 178 LBS

## 2024-12-16 DIAGNOSIS — Z00.00 WELLNESS EXAMINATION: Primary | ICD-10-CM

## 2024-12-16 DIAGNOSIS — E03.9 ACQUIRED HYPOTHYROIDISM: Chronic | ICD-10-CM

## 2024-12-16 DIAGNOSIS — I10 ESSENTIAL (PRIMARY) HYPERTENSION: Chronic | ICD-10-CM

## 2024-12-16 DIAGNOSIS — E55.9 VITAMIN D DEFICIENCY: Chronic | ICD-10-CM

## 2024-12-16 PROCEDURE — 1126F AMNT PAIN NOTED NONE PRSNT: CPT | Mod: CPTII,,, | Performed by: INTERNAL MEDICINE

## 2024-12-16 PROCEDURE — 3075F SYST BP GE 130 - 139MM HG: CPT | Mod: CPTII,,, | Performed by: INTERNAL MEDICINE

## 2024-12-16 PROCEDURE — 1159F MED LIST DOCD IN RCRD: CPT | Mod: CPTII,,, | Performed by: INTERNAL MEDICINE

## 2024-12-16 PROCEDURE — 3078F DIAST BP <80 MM HG: CPT | Mod: CPTII,,, | Performed by: INTERNAL MEDICINE

## 2024-12-16 PROCEDURE — 1160F RVW MEDS BY RX/DR IN RCRD: CPT | Mod: CPTII,,, | Performed by: INTERNAL MEDICINE

## 2024-12-16 PROCEDURE — 1101F PT FALLS ASSESS-DOCD LE1/YR: CPT | Mod: CPTII,,, | Performed by: INTERNAL MEDICINE

## 2024-12-16 PROCEDURE — 1124F ACP DISCUSS-NO DSCNMKR DOCD: CPT | Mod: CPTII,,, | Performed by: INTERNAL MEDICINE

## 2024-12-16 PROCEDURE — G0439 PPPS, SUBSEQ VISIT: HCPCS | Mod: ,,, | Performed by: INTERNAL MEDICINE

## 2024-12-16 PROCEDURE — 3288F FALL RISK ASSESSMENT DOCD: CPT | Mod: CPTII,,, | Performed by: INTERNAL MEDICINE

## 2024-12-16 RX ORDER — THYROID 15 MG/1
1 TABLET ORAL
COMMUNITY

## 2024-12-16 NOTE — ASSESSMENT & PLAN NOTE
Discussed results of examination laboratory   Says going forward at her age she does not want any new anymore bone densities are mammograms.

## 2024-12-16 NOTE — PROGRESS NOTES
Blayne Bautista MD        PATIENT NAME: Jen Ftizgerald  : 1945  DATE: 24  MRN: [id number]      Patient Care Team:  Blayne Bautista MD as PCP - General (Internal Medicine)  Blayne Bautista MD       Billing Provider: Blayne Bautista MD  Level of Service: [place] MEDICARE ANNUAL WELLNESS SUBSEQUENT VISIT  Patient PCP Information       Provider PCP Type    Blayne Bautista MD General            Reason for Visit / Chief Complaint: Medicare AWV Follow Up (Wellness)       Update PCP  Update Chief Complaint         History of Present Illness / Problem Focused Workflow     Jen Fitzgerald presents to the clinic with Medicare AWV Follow Up (Wellness)     Jen is here for annual wellness exam   She is doing well with no issues.        Review of Systems   Review of Systems   Constitutional: Negative.    HENT: Negative.     Eyes: Negative.    Respiratory: Negative.     Cardiovascular: Negative.    Gastrointestinal: Negative.    Endocrine: Negative.    Genitourinary: Negative.    Musculoskeletal: Negative.    Integumentary:  Negative.   Neurological: Negative.    Psychiatric/Behavioral: Negative.          Patient Reported Health Risk Assessment       Medical / Social / Family History     Past Medical History:   Diagnosis Date    Anxiety disorder, unspecified     Essential (primary) hypertension     Hypothyroidism, unspecified     Osteopenia     Vitamin D deficiency        Past Surgical History:   Procedure Laterality Date    APPENDECTOMY      CHOLECYSTECTOMY      HYSTERECTOMY      TONSILLECTOMY      TONSILLECTOMY         Social History  Ms. Fitzgerald  reports that she has never smoked. She has never used smokeless tobacco. She reports that she does not currently use alcohol. She reports that she does not use drugs.    Family History  Ms.'s Fitzgerald  family history includes Asthma in her mother; Cancer in her father; Diabetes in her father; Hypertension in her mother.        Medications and Allergies      Medications  Outpatient Medications Marked as Taking for the 12/16/24 encounter (Office Visit) with Blayne Bautista MD   Medication Sig Dispense Refill    amLODIPine (NORVASC) 5 MG tablet TAKE ONE TABLET BY MOUTH EACH MORNING 30 tablet 11    ARMOUR THYROID 60 mg Tab TAKE ONE TABLET BY MOUTH EACH MORNING 30 tablet 11    busPIRone (BUSPAR) 15 MG tablet TAKE ONE TABLET BY MOUTH DAILY 30 tablet 5    latanoprost 0.005 % ophthalmic solution Place 1 drop into both eyes every evening.      losartan (COZAAR) 100 MG tablet TAKE ONE TABLET BY MOUTH DAILY 30 tablet 12    NON FORMULARY MEDICATION Daily. [facility]      thyroid, pork, (NP THYROID) 15 mg Tab Take 1 mg by mouth before breakfast.      vitamin D (VITAMIN D3) 1000 units Tab Take 5,000 Units by mouth once daily.         Allergies  Review of patient's allergies indicates:   Allergen Reactions    Potassium chloride Other (See Comments)     Passes out       Physical Examination     Vitals:    12/16/24 1041   BP: 134/66   Pulse: 76     Physical Exam  Constitutional:       Appearance: Normal appearance.   HENT:      Head: Normocephalic and atraumatic.      Right Ear: Tympanic membrane normal.      Left Ear: Tympanic membrane normal.      Nose: Nose normal.      Mouth/Throat:      Mouth: Mucous membranes are moist.   Eyes:      Extraocular Movements: Extraocular movements intact.      Pupils: Pupils are equal, round, and reactive to light.   Cardiovascular:      Rate and Rhythm: Normal rate and regular rhythm.      Pulses: Normal pulses.   Pulmonary:      Effort: Pulmonary effort is normal.      Breath sounds: Normal breath sounds.   Abdominal:      General: Abdomen is flat. Bowel sounds are normal.      Palpations: Abdomen is soft.   Musculoskeletal:         General: Normal range of motion.      Cervical back: Normal range of motion and neck supple.   Skin:     General: Skin is warm and dry.   Neurological:      General: No focal deficit present.      Mental  Status: She is alert and oriented to person, place, and time.   Psychiatric:         Mood and Affect: Mood normal.         Behavior: Behavior normal.               No data to display                  12/16/2024    10:40 AM 4/10/2024     1:15 PM 2/19/2024     2:00 PM 12/5/2023    10:00 AM 11/1/2022    10:20 AM   Fall Risk Assessment - Outpatient   Mobility Status Ambulatory Ambulatory Ambulatory Ambulatory Ambulatory   Number of falls 0 1 with injury 0 0 2+   Identified as fall risk False True False False True                Assessment and Plan (including Health Maintenance)      Problem List  Smart Sets  Document Outside HM   :    Plan:       ICD-10-CM ICD-9-CM   1. Wellness examination  Z00.00 V70.0   2. Vitamin D deficiency  E55.9 268.9   3. Acquired hypothyroidism  E03.9 244.9   4. Essential (primary) hypertension  I10 401.9               Health Maintenance Due   Topic Date Due    TETANUS VACCINE  Never done    Shingles Vaccine (1 of 2) Never done    Pneumococcal Vaccines (Age 65+) (1 of 1 - PCV) Never done    RSV Vaccine (Age 60+ and Pregnant patients) (1 - 1-dose 75+ series) Never done    DEXA Scan  11/06/2020    Influenza Vaccine (1) 09/01/2024    COVID-19 Vaccine (1 - 2024-25 season) Never done       Problem List Items Addressed This Visit          Cardiac/Vascular    Essential (primary) hypertension (Chronic)    Current Assessment & Plan     Blood pressure excellent controlled continue medication            Endocrine    Vitamin D deficiency (Chronic)    Hypothyroidism, unspecified (Chronic)    Current Assessment & Plan     TSH stable continue medication            Other    RESOLVED: Wellness examination - Primary    Current Assessment & Plan     Discussed results of examination laboratory   Says going forward at her age she does not want any new anymore bone densities are mammograms.            Health Maintenance Topics with due status: Not Due       Topic Last Completion Date    Lipid Panel 12/10/2024        No future appointments.       Medicare Annual Wellness and Personalized Prevention Plan:   Fall Risk + Home Safety + Hearing Impairment + Depression Screen + Cognitive Impairment Screen + Health Risk Assessment all reviewed.         Advance Care Planning     Date: 12/16/2024  Patient did not wish or was not able to name a surrogate decision maker or provide an Advance Care Plan.           Opioid Screening: Patient medication list reviewed, patient is not taking prescription opioids. Patient is not using additional opioids than prescribed. Patient is at low risk of substance abuse based on this opioid use history.        Signature:  Blayne Cee MD  [facility]  [address] [place] [phone]    Date of encounter: 12/16/24    No follow-ups on file. In addition to their scheduled follow up, the patient has also been instructed to follow up on as needed basis.

## 2025-04-04 ENCOUNTER — OFFICE VISIT (OUTPATIENT)
Dept: INTERNAL MEDICINE | Facility: CLINIC | Age: 80
End: 2025-04-04
Payer: MEDICARE

## 2025-04-04 ENCOUNTER — LAB VISIT (OUTPATIENT)
Dept: LAB | Facility: HOSPITAL | Age: 80
End: 2025-04-04
Attending: INTERNAL MEDICINE
Payer: MEDICARE

## 2025-04-04 VITALS
WEIGHT: 180 LBS | DIASTOLIC BLOOD PRESSURE: 84 MMHG | OXYGEN SATURATION: 99 % | BODY MASS INDEX: 33.13 KG/M2 | RESPIRATION RATE: 16 BRPM | SYSTOLIC BLOOD PRESSURE: 120 MMHG | HEART RATE: 123 BPM | HEIGHT: 62 IN

## 2025-04-04 DIAGNOSIS — E03.9 ACQUIRED HYPOTHYROIDISM: ICD-10-CM

## 2025-04-04 DIAGNOSIS — M79.10 MUSCLE PAIN: ICD-10-CM

## 2025-04-04 DIAGNOSIS — E03.9 ACQUIRED HYPOTHYROIDISM: Chronic | ICD-10-CM

## 2025-04-04 DIAGNOSIS — F06.4 ANXIETY DISORDER DUE TO KNOWN PHYSIOLOGICAL CONDITION: Primary | Chronic | ICD-10-CM

## 2025-04-04 DIAGNOSIS — I10 ESSENTIAL (PRIMARY) HYPERTENSION: Chronic | ICD-10-CM

## 2025-04-04 LAB
BASOPHILS # BLD AUTO: 0.05 X10(3)/MCL
BASOPHILS NFR BLD AUTO: 0.7 %
CK SERPL-CCNC: 48 U/L (ref 29–168)
CRP SERPL-MCNC: 8.1 MG/L
EOSINOPHIL # BLD AUTO: 0.13 X10(3)/MCL (ref 0–0.9)
EOSINOPHIL NFR BLD AUTO: 1.9 %
ERYTHROCYTE [DISTWIDTH] IN BLOOD BY AUTOMATED COUNT: 14.3 % (ref 11.5–17)
ERYTHROCYTE [SEDIMENTATION RATE] IN BLOOD: 5 MM/HR (ref 0–30)
HCT VFR BLD AUTO: 44.6 % (ref 37–47)
HGB BLD-MCNC: 14.2 G/DL (ref 12–16)
IMM GRANULOCYTES # BLD AUTO: 0.02 X10(3)/MCL (ref 0–0.04)
IMM GRANULOCYTES NFR BLD AUTO: 0.3 %
LYMPHOCYTES # BLD AUTO: 1.38 X10(3)/MCL (ref 0.6–4.6)
LYMPHOCYTES NFR BLD AUTO: 19.9 %
MCH RBC QN AUTO: 27.5 PG (ref 27–31)
MCHC RBC AUTO-ENTMCNC: 31.8 G/DL (ref 33–36)
MCV RBC AUTO: 86.4 FL (ref 80–94)
MONOCYTES # BLD AUTO: 0.72 X10(3)/MCL (ref 0.1–1.3)
MONOCYTES NFR BLD AUTO: 10.4 %
NEUTROPHILS # BLD AUTO: 4.64 X10(3)/MCL (ref 2.1–9.2)
NEUTROPHILS NFR BLD AUTO: 66.8 %
NRBC BLD AUTO-RTO: 0 %
OHS QRS DURATION: 82 MS
OHS QTC CALCULATION: 441 MS
PLATELET # BLD AUTO: 308 X10(3)/MCL (ref 130–400)
PMV BLD AUTO: 9 FL (ref 7.4–10.4)
RBC # BLD AUTO: 5.16 X10(6)/MCL (ref 4.2–5.4)
TSH SERPL-ACNC: 2.14 UIU/ML (ref 0.35–4.94)
WBC # BLD AUTO: 6.94 X10(3)/MCL (ref 4.5–11.5)

## 2025-04-04 PROCEDURE — 86140 C-REACTIVE PROTEIN: CPT

## 2025-04-04 PROCEDURE — 36415 COLL VENOUS BLD VENIPUNCTURE: CPT

## 2025-04-04 PROCEDURE — 82085 ASSAY OF ALDOLASE: CPT

## 2025-04-04 PROCEDURE — 85652 RBC SED RATE AUTOMATED: CPT

## 2025-04-04 PROCEDURE — 93010 ELECTROCARDIOGRAM REPORT: CPT | Mod: ,,, | Performed by: INTERNAL MEDICINE

## 2025-04-04 PROCEDURE — 82550 ASSAY OF CK (CPK): CPT

## 2025-04-04 PROCEDURE — 85025 COMPLETE CBC W/AUTO DIFF WBC: CPT

## 2025-04-04 PROCEDURE — 84443 ASSAY THYROID STIM HORMONE: CPT

## 2025-04-04 PROCEDURE — 93005 ELECTROCARDIOGRAM TRACING: CPT

## 2025-04-04 RX ORDER — BUSPIRONE HYDROCHLORIDE 5 MG/1
5 TABLET ORAL 3 TIMES DAILY
COMMUNITY
Start: 2025-01-16

## 2025-04-04 NOTE — PROGRESS NOTES
Blayne Cee MD        PATIENT NAME: Jen Fitzgerald  : 1945  DATE: 25  MRN: [id number]      Billing Provider: Blayne Cee MD  Level of Service: MT OFFICE/OUTPT VISIT, EST, LEVL IV, 30-39 MIN  Patient PCP Information       Provider PCP Type    Blayne Cee MD General            Reason for Visit / Chief Complaint: Anxiety       Update PCP  Update Chief Complaint         History of Present Illness / Problem Focused Workflow     Jen Fitzgerald presents to the clinic with Anxiety     Jen is here having issues with the health   She has had extreme anxiety she lives alone she is concerned about many things and cause his anxiety  She is also concerned she is having palpitations and worried about her heart   She is taking BuSpar 5 mg 3 times a day which he is helping somewhat   She had the last time this happened was wrong with her thyroid  She also reports since she has been having muscle pain on her legs back abdomen and shoulder area.        Review of Systems   Review of Systems   Constitutional: Negative.    HENT: Negative.     Eyes: Negative.    Respiratory: Negative.     Cardiovascular: Negative.    Gastrointestinal: Negative.    Endocrine: Negative.    Genitourinary: Negative.    Musculoskeletal: Negative.    Integumentary:  Negative.   Neurological: Negative.    Psychiatric/Behavioral: Negative.          Medical / Social / Family History     Past Medical History:   Diagnosis Date    Anxiety disorder, unspecified     Essential (primary) hypertension     Hypothyroidism, unspecified     Osteopenia     Vitamin D deficiency        Past Surgical History:   Procedure Laterality Date    APPENDECTOMY      CHOLECYSTECTOMY      HYSTERECTOMY      TONSILLECTOMY      TONSILLECTOMY         Social History  Ms. Fitzgerald  reports that she has never smoked. She has never used smokeless tobacco. She reports that she does not currently use alcohol. She reports that she does not use drugs.    Family  History  Ms.'s Lia  family history includes Asthma in her mother; Cancer in her father; Diabetes in her father; Hypertension in her mother.    Medications and Allergies     Medications  Outpatient Medications Marked as Taking for the 4/4/25 encounter (Office Visit) with Blayne Bautista MD   Medication Sig Dispense Refill    amLODIPine (NORVASC) 5 MG tablet TAKE ONE TABLET BY MOUTH EACH MORNING 30 tablet 11    ARMOUR THYROID 60 mg Tab TAKE ONE TABLET BY MOUTH EACH MORNING 30 tablet 11    busPIRone (BUSPAR) 5 MG Tab Take 5 mg by mouth 3 (three) times daily.      latanoprost 0.005 % ophthalmic solution Place 1 drop into both eyes every evening.      losartan (COZAAR) 100 MG tablet TAKE ONE TABLET BY MOUTH DAILY 30 tablet 12    NON FORMULARY MEDICATION Daily. [facility]      thyroid, pork, (NP THYROID) 15 mg Tab Take 1 mg by mouth before breakfast.      vitamin D (VITAMIN D3) 1000 units Tab Take 5,000 Units by mouth once daily.      [DISCONTINUED] busPIRone (BUSPAR) 15 MG tablet TAKE ONE TABLET BY MOUTH DAILY 30 tablet 5       Allergies  Review of patient's allergies indicates:   Allergen Reactions    Potassium chloride Other (See Comments)     Passes out       Physical Examination     Vitals:    04/04/25 0951   BP: 120/84   Pulse: (!) 123   Resp: 16     Physical Exam  Constitutional:       Appearance: Normal appearance.   HENT:      Head: Normocephalic and atraumatic.      Right Ear: Tympanic membrane normal.      Left Ear: Tympanic membrane normal.      Nose: Nose normal.      Mouth/Throat:      Mouth: Mucous membranes are moist.   Eyes:      Extraocular Movements: Extraocular movements intact.      Pupils: Pupils are equal, round, and reactive to light.   Cardiovascular:      Rate and Rhythm: Normal rate and regular rhythm.      Pulses: Normal pulses.   Pulmonary:      Effort: Pulmonary effort is normal.      Breath sounds: Normal breath sounds.   Abdominal:      General: Abdomen is flat. Bowel sounds are  normal.      Palpations: Abdomen is soft.   Musculoskeletal:         General: Normal range of motion.      Cervical back: Normal range of motion and neck supple.   Skin:     General: Skin is warm and dry.   Neurological:      General: No focal deficit present.      Mental Status: She is alert and oriented to person, place, and time.   Psychiatric:         Mood and Affect: Mood normal.         Behavior: Behavior normal.          Assessment and Plan (including Health Maintenance)      Problem List  Smart Sets  Document Outside HM   :    Plan:    ICD-10-CM ICD-9-CM   1. Anxiety disorder due to known physiological condition  F06.4 300.00   2. Essential (primary) hypertension  I10 401.9   3. Acquired hypothyroidism  E03.9 244.9   4. Muscle pain  M79.10 729.1       Problem List Items Addressed This Visit          Psychiatric    Anxiety disorder, unspecified - Primary (Chronic)    Discussion on her anxiety we will continue her BuSpar may need to add Lexapro            Cardiac/Vascular    Essential (primary) hypertension (Chronic)    Blood pressure controlled EKG ordered today with her arrhythmia            Endocrine    Hypothyroidism, unspecified (Chronic)    Thyroid levels to be examined today            Orthopedic    Muscle pain    Laboratory workup for her muscle pain she reports                   Health Maintenance Due   Topic Date Due    TETANUS VACCINE  Never done    Shingles Vaccine (1 of 2) Never done    Pneumococcal Vaccines (Age 50+) (1 of 1 - PCV) Never done    RSV Vaccine (Age 60+ and Pregnant patients) (1 - 1-dose 75+ series) Never done    DEXA Scan  11/06/2020    Influenza Vaccine (1) 09/01/2024    COVID-19 Vaccine (1 - 2024-25 season) Never done       Problem List Items Addressed This Visit          Psychiatric    Anxiety disorder, unspecified - Primary (Chronic)    Current Assessment & Plan   Discussion on her anxiety we will continue her BuSpar may need to add Lexapro            Cardiac/Vascular     Essential (primary) hypertension (Chronic)    Current Assessment & Plan   Blood pressure controlled EKG ordered today with her arrhythmia            Endocrine    Hypothyroidism, unspecified (Chronic)    Current Assessment & Plan   Thyroid levels to be examined today            Orthopedic    Muscle pain    Current Assessment & Plan   Laboratory workup for her muscle pain she reports            Health Maintenance Topics with due status: Not Due       Topic Last Completion Date    Lipid Panel 12/10/2024       Future Appointments   Date Time Provider Department Center   4/24/2025 10:00 AM Blayne Bautista MD [facility]   12/22/2025 10:40 AM Blayne Bautista MD [facility] [place] [id number]        I spent a total of 38 minutes on the day of the visit.This includes face to face time and non-face to face time preparing to see the patient (eg, review of tests), obtaining and/or reviewing separately obtained history, documenting clinical information in the electronic or other health record, independently interpreting results and communicating results to the patient/family/caregiver, or care coordinator.      Signature:  Blayne Bautista MD  [facility]  [address] [place] [address]    Date of encounter: 4/4/25

## 2025-04-08 ENCOUNTER — TELEPHONE (OUTPATIENT)
Dept: INTERNAL MEDICINE | Facility: CLINIC | Age: 80
End: 2025-04-08
Payer: MEDICARE

## 2025-04-08 NOTE — TELEPHONE ENCOUNTER
Spoke with pt regarding lab results, informed pt we do not have the lab results at this time but once the clinic gets them we will give her a call

## 2025-04-08 NOTE — TELEPHONE ENCOUNTER
----- Message from Wilner sent at 4/8/2025  2:44 PM CDT -----  .Who Called: Jen NABEEL Stoddard is requesting information on test results.Name of Test (Lab/Mammo/Etc): labs & EKGDate of Test: 04/04/25Where the test was performed: Ordering Provider: Preferred Method of Contact: Phone CallPatient's Preferred Phone Number on File: [phone] Best Call Back Number, if different:Additional Information:

## 2025-04-10 LAB — ALDOLASE SERPL-CCNC: 4.5 U/L

## 2025-04-15 ENCOUNTER — TELEPHONE (OUTPATIENT)
Dept: INTERNAL MEDICINE | Facility: CLINIC | Age: 80
End: 2025-04-15
Payer: MEDICARE

## 2025-04-15 NOTE — TELEPHONE ENCOUNTER
----- Message from Efrain Jolly sent at 4/14/2025  4:10 PM CDT -----  Regarding: Pre Visit 04/24/25  Patient has an appointment on 4/24Fasting labs donePlease call and remind patient of appointment

## 2025-04-24 ENCOUNTER — OFFICE VISIT (OUTPATIENT)
Dept: INTERNAL MEDICINE | Facility: CLINIC | Age: 80
End: 2025-04-24
Payer: MEDICARE

## 2025-04-24 VITALS
SYSTOLIC BLOOD PRESSURE: 120 MMHG | HEIGHT: 62 IN | HEART RATE: 130 BPM | OXYGEN SATURATION: 98 % | WEIGHT: 177 LBS | DIASTOLIC BLOOD PRESSURE: 68 MMHG | BODY MASS INDEX: 32.57 KG/M2

## 2025-04-24 DIAGNOSIS — F06.4 ANXIETY DISORDER DUE TO KNOWN PHYSIOLOGICAL CONDITION: Chronic | ICD-10-CM

## 2025-04-24 DIAGNOSIS — E03.9 ACQUIRED HYPOTHYROIDISM: Chronic | ICD-10-CM

## 2025-04-24 DIAGNOSIS — M79.10 MUSCLE PAIN: ICD-10-CM

## 2025-04-24 DIAGNOSIS — I48.0 PAROXYSMAL ATRIAL FIBRILLATION: ICD-10-CM

## 2025-04-24 DIAGNOSIS — I10 ESSENTIAL (PRIMARY) HYPERTENSION: Primary | Chronic | ICD-10-CM

## 2025-04-24 PROBLEM — I48.91 ATRIAL FIBRILLATION: Status: ACTIVE | Noted: 2025-04-24

## 2025-04-24 RX ORDER — METOPROLOL SUCCINATE 25 MG/1
25 TABLET, EXTENDED RELEASE ORAL DAILY
Qty: 30 TABLET | Refills: 6 | Status: SHIPPED | OUTPATIENT
Start: 2025-04-24 | End: 2026-04-24
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
## 2025-04-24 NOTE — ASSESSMENT & PLAN NOTE
Significant anxiety ongoing now   She will continue her BuSpar   Spoke to her about beginning Lexapro visit with the onset of her new AFib will hold off on this to a cardiac workup finish

## 2025-04-24 NOTE — ASSESSMENT & PLAN NOTE
Examination reveals atrial fibrillation EKG performed heart rate is 136 irregularly irregular atrial fibrillation   Begin Eliquis 5 mg twice a day   Begin Toprol 25 mg day   Conference with Cardiology urgent request to see with [facility] asap   Phone call to Dr. Blankenship.    This took over 30 minutes lining up care and coordination of events

## 2025-04-28 ENCOUNTER — HOSPITAL ENCOUNTER (EMERGENCY)
Facility: HOSPITAL | Age: 80
Discharge: HOME | End: 2025-04-28
Attending: STUDENT IN AN ORGANIZED HEALTH CARE EDUCATION/TRAINING PROGRAM
Payer: MEDICARE

## 2025-04-28 VITALS
OXYGEN SATURATION: 97 % | RESPIRATION RATE: 19 BRPM | WEIGHT: 177 LBS | HEIGHT: 62 IN | DIASTOLIC BLOOD PRESSURE: 113 MMHG | BODY MASS INDEX: 32.57 KG/M2 | SYSTOLIC BLOOD PRESSURE: 156 MMHG | HEART RATE: 97 BPM | TEMPERATURE: 98 F

## 2025-04-28 DIAGNOSIS — R00.2 PALPITATIONS: ICD-10-CM

## 2025-04-28 DIAGNOSIS — I48.91 ATRIAL FIBRILLATION: ICD-10-CM

## 2025-04-28 DIAGNOSIS — I48.0 PAROXYSMAL ATRIAL FIBRILLATION: ICD-10-CM

## 2025-04-28 LAB
ALBUMIN SERPL-MCNC: 3.9 G/DL (ref 3.4–4.8)
ALBUMIN/GLOB SERPL: 1.1 RATIO (ref 1.1–2)
ALP SERPL-CCNC: 85 UNIT/L (ref 40–150)
ALT SERPL-CCNC: 19 UNIT/L (ref 0–55)
ANION GAP SERPL CALC-SCNC: 9 MEQ/L
APICAL FOUR CHAMBER EJECTION FRACTION: 49 %
APICAL TWO CHAMBER EJECTION FRACTION: 47 %
APTT PPP: 28 SECONDS (ref 23.2–33.7)
AST SERPL-CCNC: 21 UNIT/L (ref 11–45)
AV INDEX (PROSTH): 0.63
AV MEAN GRADIENT: 1 MMHG
AV PEAK GRADIENT: 3 MMHG
AV VALVE AREA BY VELOCITY RATIO: 2.2 CM²
AV VALVE AREA: 2.2 CM²
AV VELOCITY RATIO: 0.63
BACTERIA #/AREA URNS AUTO: ABNORMAL /HPF
BASOPHILS # BLD AUTO: 0.06 X10(3)/MCL
BASOPHILS NFR BLD AUTO: 0.9 %
BILIRUB SERPL-MCNC: 0.6 MG/DL
BILIRUB UR QL STRIP.AUTO: NEGATIVE
BNP BLD-MCNC: 157.1 PG/ML
BSA FOR ECHO PROCEDURE: 1.87 M2
BUN SERPL-MCNC: 13.9 MG/DL (ref 9.8–20.1)
CALCIUM SERPL-MCNC: 9.7 MG/DL (ref 8.4–10.2)
CHLORIDE SERPL-SCNC: 107 MMOL/L (ref 98–107)
CLARITY UR: CLEAR
CO2 SERPL-SCNC: 21 MMOL/L (ref 23–31)
COLOR UR AUTO: COLORLESS
CREAT SERPL-MCNC: 0.74 MG/DL (ref 0.55–1.02)
CREAT/UREA NIT SERPL: 19
CV ECHO LV RWT: 0.5 CM
DOP CALC AO PEAK VEL: 0.8 M/S
DOP CALC AO VTI: 14.7 CM
DOP CALC LVOT AREA: 3.5 CM2
DOP CALC LVOT DIAMETER: 2.1 CM
DOP CALC LVOT PEAK VEL: 0.5 M/S
DOP CALC LVOT STROKE VOLUME: 32.2 CM3
DOP CALC MV VTI: 15.7 CM
DOP CALCLVOT PEAK VEL VTI: 9.3 CM
E WAVE DECELERATION TIME: 206 MSEC
E/E' RATIO: 7 M/S
ECHO LV POSTERIOR WALL: 1.1 CM (ref 0.6–1.1)
EOSINOPHIL # BLD AUTO: 0.08 X10(3)/MCL (ref 0–0.9)
EOSINOPHIL NFR BLD AUTO: 1.3 %
ERYTHROCYTE [DISTWIDTH] IN BLOOD BY AUTOMATED COUNT: 14.2 % (ref 11.5–17)
FLUAV AG UPPER RESP QL IA.RAPID: NOT DETECTED
FLUBV AG UPPER RESP QL IA.RAPID: NOT DETECTED
FRACTIONAL SHORTENING: 36.4 % (ref 28–44)
GFR SERPLBLD CREATININE-BSD FMLA CKD-EPI: >60 ML/MIN/1.73/M2
GLOBULIN SER-MCNC: 3.4 GM/DL (ref 2.4–3.5)
GLUCOSE SERPL-MCNC: 107 MG/DL (ref 82–115)
GLUCOSE UR QL STRIP: NORMAL
HCT VFR BLD AUTO: 47.1 % (ref 37–47)
HGB BLD-MCNC: 15 G/DL (ref 12–16)
HGB UR QL STRIP: NEGATIVE
IMM GRANULOCYTES # BLD AUTO: 0.02 X10(3)/MCL (ref 0–0.04)
IMM GRANULOCYTES NFR BLD AUTO: 0.3 %
INR PPP: 1.1
INTERVENTRICULAR SEPTUM: 1 CM (ref 0.6–1.1)
KETONES UR QL STRIP: NEGATIVE
LEFT ATRIUM AREA SYSTOLIC (APICAL 2 CHAMBER): 20 CM2
LEFT ATRIUM AREA SYSTOLIC (APICAL 4 CHAMBER): 16.2 CM2
LEFT ATRIUM SIZE: 4.1 CM
LEFT ATRIUM VOLUME INDEX MOD: 28 ML/M2
LEFT ATRIUM VOLUME MOD: 50 ML
LEFT INTERNAL DIMENSION IN SYSTOLE: 2.8 CM (ref 2.1–4)
LEFT VENTRICLE DIASTOLIC VOLUME INDEX: 48.62 ML/M2
LEFT VENTRICLE DIASTOLIC VOLUME: 88 ML
LEFT VENTRICLE END DIASTOLIC VOLUME APICAL 2 CHAMBER: 64.9 ML
LEFT VENTRICLE END DIASTOLIC VOLUME APICAL 4 CHAMBER: 61.6 ML
LEFT VENTRICLE END SYSTOLIC VOLUME APICAL 2 CHAMBER: 63 ML
LEFT VENTRICLE END SYSTOLIC VOLUME APICAL 4 CHAMBER: 36.3 ML
LEFT VENTRICLE MASS INDEX: 87.4 G/M2
LEFT VENTRICLE SYSTOLIC VOLUME INDEX: 16.6 ML/M2
LEFT VENTRICLE SYSTOLIC VOLUME: 30 ML
LEFT VENTRICULAR INTERNAL DIMENSION IN DIASTOLE: 4.4 CM (ref 3.5–6)
LEFT VENTRICULAR MASS: 158.2 G
LEUKOCYTE ESTERASE UR QL STRIP: NEGATIVE
LV LATERAL E/E' RATIO: 5.6 M/S
LV SEPTAL E/E' RATIO: 10 M/S
LVED V (TEICH): 87.69 ML
LVES V (TEICH): 29.55 ML
LVOT MG: 1 MMHG
LVOT MV: 0.38 CM/S
LYMPHOCYTES # BLD AUTO: 1.59 X10(3)/MCL (ref 0.6–4.6)
LYMPHOCYTES NFR BLD AUTO: 24.9 %
MAGNESIUM SERPL-MCNC: 2.1 MG/DL (ref 1.6–2.6)
MCH RBC QN AUTO: 26.8 PG (ref 27–31)
MCHC RBC AUTO-ENTMCNC: 31.8 G/DL (ref 33–36)
MCV RBC AUTO: 84.3 FL (ref 80–94)
MONOCYTES # BLD AUTO: 0.48 X10(3)/MCL (ref 0.1–1.3)
MONOCYTES NFR BLD AUTO: 7.5 %
MV MEAN GRADIENT: 2 MMHG
MV PEAK E VEL: 0.9 M/S
MV PEAK GRADIENT: 3 MMHG
MV STENOSIS PRESSURE HALF TIME: 55 MS
MV VALVE AREA BY CONTINUITY EQUATION: 2.05 CM2
MV VALVE AREA P 1/2 METHOD: 4 CM2
NEUTROPHILS # BLD AUTO: 4.16 X10(3)/MCL (ref 2.1–9.2)
NEUTROPHILS NFR BLD AUTO: 65.1 %
NITRITE UR QL STRIP: NEGATIVE
NRBC BLD AUTO-RTO: 0 %
OHS LV EJECTION FRACTION SIMPSONS BIPLANE MOD: 48 %
OHS QRS DURATION: 86 MS
OHS QTC CALCULATION: 401 MS
PH UR STRIP: 6.5 [PH]
PISA TR MAX VEL: 2.8 M/S
PLATELET # BLD AUTO: 326 X10(3)/MCL (ref 130–400)
PMV BLD AUTO: 9.1 FL (ref 7.4–10.4)
POTASSIUM SERPL-SCNC: 4.1 MMOL/L (ref 3.5–5.1)
PROT SERPL-MCNC: 7.3 GM/DL (ref 5.8–7.6)
PROT UR QL STRIP: NEGATIVE
PROTHROMBIN TIME: 14.1 SECONDS (ref 12.5–14.5)
PV PEAK GRADIENT: 1 MMHG
PV PEAK VELOCITY: 0.6 M/S
RA PRESSURE ESTIMATED: 3 MMHG
RBC # BLD AUTO: 5.59 X10(6)/MCL (ref 4.2–5.4)
RBC #/AREA URNS AUTO: ABNORMAL /HPF
RV TB RVSP: 6 MMHG
SARS-COV-2 RNA RESP QL NAA+PROBE: NOT DETECTED
SODIUM SERPL-SCNC: 137 MMOL/L (ref 136–145)
SP GR UR STRIP.AUTO: 1 (ref 1–1.03)
SQUAMOUS #/AREA URNS LPF: ABNORMAL /HPF
TDI LATERAL: 0.16 M/S
TDI SEPTAL: 0.09 M/S
TDI: 0.13 M/S
TR MAX PG: 23 MMHG
TRICUSPID ANNULAR PLANE SYSTOLIC EXCURSION: 1.5 CM
TROPONIN I SERPL-MCNC: <0.01 NG/ML (ref 0–0.04)
TROPONIN I SERPL-MCNC: <0.01 NG/ML (ref 0–0.04)
TSH SERPL-ACNC: 1.52 UIU/ML (ref 0.35–4.94)
TV REST PULMONARY ARTERY PRESSURE: 34 MMHG
UROBILINOGEN UR STRIP-ACNC: NORMAL
WBC # BLD AUTO: 6.39 X10(3)/MCL (ref 4.5–11.5)
WBC #/AREA URNS AUTO: ABNORMAL /HPF
Z-SCORE OF LEFT VENTRICULAR DIMENSION IN END DIASTOLE: -1.29
Z-SCORE OF LEFT VENTRICULAR DIMENSION IN END SYSTOLE: -0.79

## 2025-04-28 PROCEDURE — 83735 ASSAY OF MAGNESIUM: CPT | Performed by: STUDENT IN AN ORGANIZED HEALTH CARE EDUCATION/TRAINING PROGRAM

## 2025-04-28 PROCEDURE — 99291 CRITICAL CARE FIRST HOUR: CPT

## 2025-04-28 PROCEDURE — 85610 PROTHROMBIN TIME: CPT | Performed by: STUDENT IN AN ORGANIZED HEALTH CARE EDUCATION/TRAINING PROGRAM

## 2025-04-28 PROCEDURE — 80053 COMPREHEN METABOLIC PANEL: CPT | Performed by: STUDENT IN AN ORGANIZED HEALTH CARE EDUCATION/TRAINING PROGRAM

## 2025-04-28 PROCEDURE — 85025 COMPLETE CBC W/AUTO DIFF WBC: CPT | Performed by: STUDENT IN AN ORGANIZED HEALTH CARE EDUCATION/TRAINING PROGRAM

## 2025-04-28 PROCEDURE — 84443 ASSAY THYROID STIM HORMONE: CPT | Performed by: STUDENT IN AN ORGANIZED HEALTH CARE EDUCATION/TRAINING PROGRAM

## 2025-04-28 PROCEDURE — 96374 THER/PROPH/DIAG INJ IV PUSH: CPT

## 2025-04-28 PROCEDURE — 25000003 PHARM REV CODE 250: Performed by: STUDENT IN AN ORGANIZED HEALTH CARE EDUCATION/TRAINING PROGRAM

## 2025-04-28 PROCEDURE — 93010 ELECTROCARDIOGRAM REPORT: CPT | Mod: ,,, | Performed by: INTERNAL MEDICINE

## 2025-04-28 PROCEDURE — 96361 HYDRATE IV INFUSION ADD-ON: CPT

## 2025-04-28 PROCEDURE — 81001 URINALYSIS AUTO W/SCOPE: CPT | Performed by: STUDENT IN AN ORGANIZED HEALTH CARE EDUCATION/TRAINING PROGRAM

## 2025-04-28 PROCEDURE — 83880 ASSAY OF NATRIURETIC PEPTIDE: CPT | Performed by: STUDENT IN AN ORGANIZED HEALTH CARE EDUCATION/TRAINING PROGRAM

## 2025-04-28 PROCEDURE — 0240U COVID/FLU A&B PCR: CPT | Performed by: STUDENT IN AN ORGANIZED HEALTH CARE EDUCATION/TRAINING PROGRAM

## 2025-04-28 PROCEDURE — 93005 ELECTROCARDIOGRAM TRACING: CPT

## 2025-04-28 PROCEDURE — 85730 THROMBOPLASTIN TIME PARTIAL: CPT | Performed by: STUDENT IN AN ORGANIZED HEALTH CARE EDUCATION/TRAINING PROGRAM

## 2025-04-28 PROCEDURE — 84484 ASSAY OF TROPONIN QUANT: CPT | Performed by: STUDENT IN AN ORGANIZED HEALTH CARE EDUCATION/TRAINING PROGRAM

## 2025-04-28 RX ORDER — METOPROLOL TARTRATE 1 MG/ML
5 INJECTION, SOLUTION INTRAVENOUS
Status: COMPLETED | OUTPATIENT
Start: 2025-04-28 | End: 2025-04-28

## 2025-04-28 RX ORDER — METOPROLOL SUCCINATE 25 MG/1
75 TABLET, EXTENDED RELEASE ORAL DAILY
Qty: 90 TABLET | Refills: 0 | Status: SHIPPED | OUTPATIENT
Start: 2025-04-28 | End: 2025-05-28

## 2025-04-28 RX ORDER — METOPROLOL SUCCINATE 50 MG/1
50 TABLET, EXTENDED RELEASE ORAL
Status: COMPLETED | OUTPATIENT
Start: 2025-04-28 | End: 2025-04-28

## 2025-04-28 RX ADMIN — METOPROLOL TARTRATE 5 MG: 1 INJECTION, SOLUTION INTRAVENOUS at 08:04

## 2025-04-28 RX ADMIN — METOPROLOL SUCCINATE 50 MG: 50 TABLET, EXTENDED RELEASE ORAL at 12:04

## 2025-04-28 RX ADMIN — SODIUM CHLORIDE 1000 ML: 9 INJECTION, SOLUTION INTRAVENOUS at 08:04

## 2025-04-28 NOTE — DISCHARGE INSTRUCTIONS
Follow-up with your primary care physician.      We will increase your dose of metoprolol to 75 mg daily.    Follow-up with your cardiologist.      Return to the emergency department if any new or worsening symptoms, fast heart rate, chest pain, shortness of breath, nausea, vomiting, difficulty breathing, fever, headache, or any other concerns.

## 2025-04-28 NOTE — ED PROVIDER NOTES
"Encounter Date: 4/28/2025    SCRIBE #1 NOTE: I, Tammy Sow, am scribing for, and in the presence of,  Jelani Kraus MD. I have scribed the following portions of the note - the EKG reading. Other sections scribed: HPI, ROS, PE.       History     Chief Complaint   Patient presents with    Palpitations     Pt arrives POV and ambulatory in triage with C/O palpations, SOB at rest and pt stated "it feels like my heart is heavy". Recently diagnosed with A Fib on Thursday. Currently taking metoprolol and eliquis. Patient alert. GCS 15. Daughter in triage.      Patient is an 79-year-old female with a PMHx of anxiety, HTN, hypothyroidism, and osteopenia presents to the ED for palpitations, chest heaviness, and shortness of breath beginning this morning. Patient reports anxiety. She reports she was diagnosed with a-fib 4 days ago by Dr. Bautista. She reports she was prescribed metoprolol 25 mg once daily and eliquis 5 mg. She denies leg swelling.    Cardiologist: Dr. Hannah Blankenship.    The history is provided by the patient and medical records. No  was used.     Review of patient's allergies indicates:   Allergen Reactions    Potassium chloride Other (See Comments)     Passes out     Past Medical History:   Diagnosis Date    Anxiety disorder, unspecified     Essential (primary) hypertension     Hypothyroidism, unspecified     Osteopenia     Vitamin D deficiency      Past Surgical History:   Procedure Laterality Date    APPENDECTOMY      CHOLECYSTECTOMY      HYSTERECTOMY      TONSILLECTOMY      TONSILLECTOMY       Family History   Problem Relation Name Age of Onset    Hypertension Mother Kaylee perez     Asthma Mother Kaylee perez     Cancer Father Sammie sahu     Diabetes Father Sammie Maurizio sahu      Social History[1]  Review of Systems   Constitutional:  Negative for fever.   HENT:  Negative for sore throat.    Eyes:  Negative for visual disturbance.   Respiratory:  Positive for shortness of " breath.    Cardiovascular:  Positive for chest pain and palpitations. Negative for leg swelling.   Gastrointestinal:  Negative for abdominal pain.   Genitourinary:  Negative for dysuria.   Musculoskeletal:  Negative for joint swelling.   Skin:  Negative for rash.   Neurological:  Negative for weakness.   Psychiatric/Behavioral:  Negative for confusion. The patient is nervous/anxious.        Physical Exam     Initial Vitals [04/28/25 0829]   BP Pulse Resp Temp SpO2   121/75 (!) 146 20 98.1 °F (36.7 °C) 96 %      MAP       --         Physical Exam    Nursing note and vitals reviewed.  Constitutional: She appears well-developed and well-nourished.   HENT:   Head: Normocephalic and atraumatic.   Eyes: EOM are normal. Pupils are equal, round, and reactive to light.   Neck:   Normal range of motion.  Cardiovascular:  Normal heart sounds and intact distal pulses. An irregularly irregular rhythm present.   Tachycardia present.         No murmur heard.  Pulmonary/Chest: Breath sounds normal. No respiratory distress. She has no wheezes. She has no rales.   Abdominal: Abdomen is soft. She exhibits no distension. There is no abdominal tenderness. There is no rebound.   Musculoskeletal:         General: No tenderness or edema. Normal range of motion.      Cervical back: Normal range of motion.     Neurological: She is alert. She has normal strength. No cranial nerve deficit. GCS score is 15. GCS eye subscore is 4. GCS verbal subscore is 5. GCS motor subscore is 6.   Skin: Skin is warm and dry. Capillary refill takes less than 2 seconds. No rash noted. No erythema.   Psychiatric: Her mood appears anxious.         ED Course   Critical Care    Date/Time: 4/28/2025 9:02 AM    Performed by: Jelani Kraus MD  Authorized by: Jelani Kraus MD  Direct patient critical care time: 12 minutes  Additional history critical care time: 4 minutes  Ordering / reviewing critical care time: 4 minutes  Documentation critical care time: 3  minutes  Consulting other physicians critical care time: 8 minutes  Total critical care time (exclusive of procedural time) : 31 minutes  Critical care time was exclusive of separately billable procedures and treating other patients and teaching time.  Critical care was necessary to treat or prevent imminent or life-threatening deterioration of the following conditions: circulatory failure and cardiac failure.  Critical care was time spent personally by me on the following activities: development of treatment plan with patient or surrogate, discussions with consultants, interpretation of cardiac output measurements, evaluation of patient's response to treatment, examination of patient, ordering and review of laboratory studies, obtaining history from patient or surrogate, ordering and performing treatments and interventions, ordering and review of radiographic studies, pulse oximetry, re-evaluation of patient's condition and review of old charts.        Labs Reviewed   B-TYPE NATRIURETIC PEPTIDE - Abnormal       Result Value    Natriuretic Peptide 157.1 (*)    COMPREHENSIVE METABOLIC PANEL - Abnormal    Sodium 137      Potassium 4.1      Chloride 107      CO2 21 (*)     Glucose 107      Blood Urea Nitrogen 13.9      Creatinine 0.74      Calcium 9.7      Protein Total 7.3      Albumin 3.9      Globulin 3.4      Albumin/Globulin Ratio 1.1      Bilirubin Total 0.6      ALP 85      ALT 19      AST 21      eGFR >60      Anion Gap 9.0      BUN/Creatinine Ratio 19     URINALYSIS, REFLEX TO URINE CULTURE - Abnormal    Color, UA Colorless      Appearance, UA Clear      Specific Gravity, UA 1.004 (*)     pH, UA 6.5      Protein, UA Negative      Glucose, UA Normal      Ketones, UA Negative      Blood, UA Negative      Bilirubin, UA Negative      Urobilinogen, UA Normal      Nitrites, UA Negative      Leukocyte Esterase, UA Negative      RBC, UA None Seen      WBC, UA 0-5      Bacteria, UA None Seen      Squamous Epithelial  Cells, UA Trace     CBC WITH DIFFERENTIAL - Abnormal    WBC 6.39      RBC 5.59 (*)     Hgb 15.0      Hct 47.1 (*)     MCV 84.3      MCH 26.8 (*)     MCHC 31.8 (*)     RDW 14.2      Platelet 326      MPV 9.1      Neut % 65.1      Lymph % 24.9      Mono % 7.5      Eos % 1.3      Basophil % 0.9      Imm Grans % 0.3      Neut # 4.16      Lymph # 1.59      Mono # 0.48      Eos # 0.08      Baso # 0.06      Imm Gran # 0.02      NRBC% 0.0     APTT - Normal    PTT 28.0     PROTIME-INR - Normal    PT 14.1      INR 1.1      Narrative:     Protimes are used to monitor anticoagulant agents such as warfarin. PT INR values are based on the current patient normal mean and the EUGENIO value for the specific instrument reagent used.  **Routine theraputic target values for the INR are 2.0-3.0**   TROPONIN I - Normal    Troponin-I <0.010     COVID/FLU A&B PCR - Normal    Influenza A PCR Not Detected      Influenza B PCR Not Detected      SARS-CoV-2 PCR Not Detected      Narrative:     The Xpert Xpress SARS-CoV-2/FLU/RSV plus is a rapid, multiplexed real-time PCR test intended for the simultaneous qualitative detection and differentiation of SARS-CoV-2, Influenza A, Influenza B, and respiratory syncytial virus (RSV) viral RNA in either nasopharyngeal swab or nasal swab specimens.         TSH - Normal    TSH 1.523     MAGNESIUM - Normal    Magnesium Level 2.10     TROPONIN I - Normal    Troponin-I <0.010     CBC W/ AUTO DIFFERENTIAL    Narrative:     The following orders were created for panel order CBC auto differential.  Procedure                               Abnormality         Status                     ---------                               -----------         ------                     CBC with Differential[[id number]]       Abnormal            Final result                 Please view results for these tests on the individual orders.     EKG Readings: (Independently Interpreted)   Initial Reading: No STEMI. Previous EKG Date: 4/4/25.  Rhythm: Atrial Fibrillation. Heart Rate: 125. Ectopy: No Ectopy. Conduction: Normal. ST Segments: Normal ST Segments. T Waves: Normal. Clinical Impression: Atrial Fibrillation with RVR   Done at 8:28 on 4/28/25 RVR     ECG Results              EKG 12-lead (Final result)        Collection Time Result Time QRS Duration OHS QTC Calculation    04/28/25 08:28:55 04/28/25 13:01:27 86 401                     Final result by Interface, Lab In [facility] (04/28/25 13:01:38)                   Narrative:    Test Reason : R00.2,    Vent. Rate : 125 BPM     Atrial Rate :    BPM     P-R Int :    ms          QRS Dur :  86 ms      QT Int : 278 ms       P-R-T Axes :    -20  33 degrees    QTcB Int : 401 ms    Atrial fibrillation with rapid ventricular response  Possible Anterior infarct ,age undetermined  Abnormal ECG  When compared with ECG of 04-Apr-2025 09:28,  Nonspecific T wave abnormality, worse in Lateral leads  Confirmed by Rafael Cavanaugh ([id number]) on 4/28/2025 1:01:24 PM    Referred By:            Confirmed By: Rafael Cavanaugh                                     EKG 12-lead (Final result)  Result time 05/02/25 09:34:12      Final result by Unknown User (05/02/25 09:34:12)                                      Imaging Results              X-Ray Chest AP Portable (Final result)  Result time 04/28/25 09:05:53      Final result by Gamaliel White MD (04/28/25 09:05:53)                   Impression:      No acute cardiopulmonary process identified.      Electronically signed by: Gamlaiel White  Date:    04/28/2025  Time:    09:05               Narrative:    EXAMINATION:  XR CHEST AP PORTABLE    CLINICAL HISTORY:  Palpitations    TECHNIQUE:  Two-view    COMPARISON:  July 18, 2013.    FINDINGS:  Cardiopericardial silhouette appearance is similar.  Lungs are without dense focal or segmental consolidation, congestion, pleural effusion or pneumothorax.                                       Medications   metoprolol injection 5 mg (5 mg Intravenous  Given 4/28/25 0857)   sodium chloride 0.9% bolus 1,000 mL 1,000 mL (0 mLs Intravenous Stopped 4/28/25 1054)   metoprolol succinate (TOPROL-XL) 24 hr tablet 50 mg (50 mg Oral Given 4/28/25 1217)     Medical Decision Making  Judging by the patient's chief complaint and pertinent history, the patient has the following possible differential diagnoses, including but not limited to the following.  Some of these are deemed to be lower likelihood and some more likely based on my physical exam and history combined with possible lab work and/or imaging studies.   Please see the pertinent studies, and refer to the HPI.  Some of these diagnoses will take further evaluation to fully rule out, perhaps as an outpatient and the patient was encouraged to follow up when discharged for more comprehensive evaluation.    Arrhythmia, drug use, acs, structural heart disease, anemia, electrolyte derangement, hypoglycemia, hyperthyroidism, fever, dehydration, medication induced, anxiety, panic disorder, caffeine/energy supplement side effect      Patient is a 79-year-old female recently diagnosed with atrial fibrillation presents to the emergency department for tachycardia, patient appears to be in AFib with RVR.  See HPI.  See physical exam.  Patient was recently started on metoprolol on Eliquis.  She was given additional doses of IV metoprolol with rate control achieved.  Chest x-ray without any acute findings.  Lab work as noted.  Discussed case with cardiology who recommends increasing dose of metoprolol.  All results discussed with the patient and family.  Shared decision making used to determine disposition.  Patient reports improvement in his symptoms on reassessment.  Reasonable to continue outpatient management with cardiology follow-up.  Discussed need for follow-up with primary care.  Discussed return precautions.  Answered all questions at this time.  Hemodynamically stable for continued outpatient management strict return  precautions.  Patient and family verbalized understanding and comfortable with the plan as discussed.    Problems Addressed:  Atrial fibrillation: acute illness or injury that poses a threat to life or bodily functions  Palpitations: acute illness or injury that poses a threat to life or bodily functions    Amount and/or Complexity of Data Reviewed  Labs: ordered.  Radiology: ordered.  ECG/medicine tests: ordered and independent interpretation performed.     Details: No STEMI. Previous EKG Date: 4/4/25. Rhythm: Atrial Fibrillation. Heart Rate: 125. Ectopy: No Ectopy. Conduction: Normal. ST Segments: Normal ST Segments. T Waves: Normal. Clinical Impression: Atrial Fibrillation with RVR   Done at 8:28 on 4/28/25   Discussion of management or test interpretation with external provider(s): Discussed case with Cardiology    Risk  Prescription drug management.  Parenteral controlled substances.  Decision regarding hospitalization.            Scribe Attestation:   Scribe #1: I performed the above scribed service and the documentation accurately describes the services I performed. I attest to the accuracy of the note.    Attending Attestation:           Physician Attestation for Scribe:  Physician Attestation Statement for Scribe #1: I, Jelani Kraus MD, reviewed documentation, as scribed by Tammy Sow in my presence, and it is both accurate and complete.             ED Course as of 05/20/25 0936   Mon Apr 28, 2025   1055 Paged [facility].  [RP]   1104 Discussed with [facility] [MB]   1106 Discussed with Quintero with [facility], recommends increasing dose of metoprolol to 75 mg daily.  Okay to follow-up in office tomorrow. [RP]      ED Course User Index  [MB] Tammy Sow  [RP] Jelani Kraus MD                           Clinical Impression:  Final diagnoses:  [R00.2] Palpitations  [I48.91] Atrial fibrillation          ED Disposition Condition    Discharge Stable          ED Prescriptions       Medication Sig Dispense Start Date End Date  Auth. Provider    metoprolol succinate (TOPROL-XL) 25 MG 24 hr tablet Take 3 tablets (75 mg total) by mouth once daily. 90 tablet 4/28/2025 5/28/2025 Jelani Kraus MD          Follow-up Information       Follow up With Specialties Details Why Contact Info    Blayne Bautista MD Internal Medicine   [address] [facility] [address]  [phone]      Hannah Blankenship MD Cardiovascular Disease, Cardiology   [address] [facility] [address]  [phone]                 [1]   Social History  Tobacco Use    Smoking status: Never    Smokeless tobacco: Never   Substance Use Topics    Alcohol use: Not Currently    Drug use: Never        Jelani Kraus MD  05/20/25 0914

## 2025-05-26 ENCOUNTER — LAB VISIT (OUTPATIENT)
Dept: LAB | Facility: HOSPITAL | Age: 80
End: 2025-05-26
Attending: INTERNAL MEDICINE
Payer: MEDICARE

## 2025-05-26 DIAGNOSIS — I48.91 ATRIAL FIBRILLATION: Primary | ICD-10-CM

## 2025-05-26 LAB
ERYTHROCYTE [DISTWIDTH] IN BLOOD BY AUTOMATED COUNT: 14.1 % (ref 11.5–17)
HCT VFR BLD AUTO: 44 % (ref 37–47)
HGB BLD-MCNC: 14.3 G/DL (ref 12–16)
MCH RBC QN AUTO: 27.6 PG (ref 27–31)
MCHC RBC AUTO-ENTMCNC: 32.5 G/DL (ref 33–36)
MCV RBC AUTO: 84.8 FL (ref 80–94)
PLATELET # BLD AUTO: 299 X10(3)/MCL (ref 130–400)
PMV BLD AUTO: 8.7 FL (ref 7.4–10.4)
RBC # BLD AUTO: 5.19 X10(6)/MCL (ref 4.2–5.4)
WBC # BLD AUTO: 7.61 X10(3)/MCL (ref 4.5–11.5)

## 2025-05-26 PROCEDURE — 36415 COLL VENOUS BLD VENIPUNCTURE: CPT

## 2025-05-26 PROCEDURE — 85027 COMPLETE CBC AUTOMATED: CPT

## 2025-07-07 ENCOUNTER — ANESTHESIA (OUTPATIENT)
Dept: CARDIOLOGY | Facility: HOSPITAL | Age: 80
End: 2025-07-07
Payer: MEDICARE

## 2025-07-07 ENCOUNTER — HOSPITAL ENCOUNTER (OUTPATIENT)
Dept: CARDIOLOGY | Facility: HOSPITAL | Age: 80
Discharge: HOME | End: 2025-07-07
Attending: INTERNAL MEDICINE | Admitting: INTERNAL MEDICINE
Payer: MEDICARE

## 2025-07-07 ENCOUNTER — ANESTHESIA EVENT (OUTPATIENT)
Dept: CARDIOLOGY | Facility: HOSPITAL | Age: 80
End: 2025-07-07
Payer: MEDICARE

## 2025-07-07 VITALS
SYSTOLIC BLOOD PRESSURE: 156 MMHG | DIASTOLIC BLOOD PRESSURE: 97 MMHG | OXYGEN SATURATION: 91 % | HEART RATE: 63 BPM | RESPIRATION RATE: 19 BRPM

## 2025-07-07 VITALS
OXYGEN SATURATION: 100 % | DIASTOLIC BLOOD PRESSURE: 87 MMHG | SYSTOLIC BLOOD PRESSURE: 154 MMHG | HEIGHT: 63 IN | RESPIRATION RATE: 17 BRPM | BODY MASS INDEX: 30.97 KG/M2 | TEMPERATURE: 98 F | HEART RATE: 51 BPM | WEIGHT: 174.81 LBS

## 2025-07-07 DIAGNOSIS — I48.19 PERSISTENT ATRIAL FIBRILLATION: ICD-10-CM

## 2025-07-07 DIAGNOSIS — I48.19 PERSISTENT ATRIAL FIBRILLATION: Primary | ICD-10-CM

## 2025-07-07 DIAGNOSIS — Z92.89 HISTORY OF CARDIOVERSION: ICD-10-CM

## 2025-07-07 DIAGNOSIS — I48.91 A-FIB: ICD-10-CM

## 2025-07-07 LAB
OHS QRS DURATION: 88 MS
OHS QRS DURATION: 94 MS
OHS QTC CALCULATION: 461 MS
OHS QTC CALCULATION: 464 MS

## 2025-07-07 PROCEDURE — 63600175 PHARM REV CODE 636 W HCPCS

## 2025-07-07 PROCEDURE — 37000008 HC ANESTHESIA 1ST 15 MINUTES

## 2025-07-07 PROCEDURE — 92960 CARDIOVERSION ELECTRIC EXT: CPT

## 2025-07-07 PROCEDURE — 93005 ELECTROCARDIOGRAM TRACING: CPT

## 2025-07-07 PROCEDURE — 25000003 PHARM REV CODE 250

## 2025-07-07 PROCEDURE — 93010 ELECTROCARDIOGRAM REPORT: CPT | Mod: ,,, | Performed by: STUDENT IN AN ORGANIZED HEALTH CARE EDUCATION/TRAINING PROGRAM

## 2025-07-07 RX ORDER — PROPOFOL 10 MG/ML
VIAL (ML) INTRAVENOUS
Status: DISCONTINUED | OUTPATIENT
Start: 2025-07-07 | End: 2025-07-07

## 2025-07-07 RX ORDER — SOTALOL HYDROCHLORIDE 80 MG/1
80 TABLET ORAL 2 TIMES DAILY
Status: ON HOLD | COMMUNITY
Start: 2025-06-25 | End: 2025-07-12

## 2025-07-07 RX ORDER — LIDOCAINE HYDROCHLORIDE 20 MG/ML
INJECTION, SOLUTION EPIDURAL; INFILTRATION; INTRACAUDAL; PERINEURAL
Status: DISCONTINUED | OUTPATIENT
Start: 2025-07-07 | End: 2025-07-07

## 2025-07-07 RX ADMIN — SODIUM CHLORIDE, SODIUM GLUCONATE, SODIUM ACETATE, POTASSIUM CHLORIDE AND MAGNESIUM CHLORIDE: 526; 502; 368; 37; 30 INJECTION, SOLUTION INTRAVENOUS at 07:07

## 2025-07-07 RX ADMIN — PROPOFOL 80 MG: 10 INJECTION, EMULSION INTRAVENOUS at 07:07

## 2025-07-07 RX ADMIN — LIDOCAINE HYDROCHLORIDE 40 MG: 20 INJECTION, SOLUTION EPIDURAL; INFILTRATION; INTRACAUDAL; PERINEURAL at 07:07

## 2025-07-07 NOTE — TRANSFER OF CARE
"Anesthesia Transfer of Care Note    Patient: Jen Fitzgerald    Procedure(s) Performed: * No procedures listed *    Patient location: PACU    Anesthesia Type: MAC    Transport from OR: Transported from OR on room air with adequate spontaneous ventilation    Post pain: adequate analgesia    Post assessment: no apparent anesthetic complications    Post vital signs: stable    Level of consciousness: awake    Nausea/Vomiting: no nausea/vomiting    Complications: none    Transfer of care protocol was followed    Last vitals: Visit Vitals  BP (!) 155/94 (BP Location: Left arm, Patient Position: Lying)   Pulse (!) 115   Temp 36.4 °C (97.6 °F) (Oral)   Resp 18   Ht 5' 3" (1.6 m)   Wt 79.3 kg (174 lb 13.2 oz)   SpO2 97%   Breastfeeding No   BMI 30.97 kg/m²     "

## 2025-07-07 NOTE — ANESTHESIA POSTPROCEDURE EVALUATION
"Anesthesia Post Evaluation    Patient: Jen Fitzgearld    Procedure(s) Performed: * No procedures listed *    Final Anesthesia Type: general      Patient location during evaluation: PACU  Patient participation: Yes- Able to Participate  Level of consciousness: awake  Post-procedure vital signs: reviewed and stable  Pain management: adequate  Airway patency: patent  RADHAMES mitigation strategies: Multimodal analgesia  PONV status at discharge: No PONV  Anesthetic complications: no      Cardiovascular status: hemodynamically stable  Respiratory status: spontaneous ventilation  Hydration status: euvolemic  Follow-up not needed.                BP (!) 155/94 (BP Location: Left arm, Patient Position: Lying)   Pulse (!) 115   Temp 36.4 °C (97.6 °F) (Oral)   Resp 18   Ht 5' 3" (1.6 m)   Wt 79.3 kg (174 lb 13.2 oz)   SpO2 97%   Breastfeeding No   BMI 30.97 kg/m²       No case tracking events are documented in the log.      Pain/Ligia Score: Ligia Score: 10 (7/7/2025  7:40 AM)          "

## 2025-07-07 NOTE — DISCHARGE SUMMARY
[facility] - Cardiology Diagnostics  Discharge Note  Short Stay    Cardioversion      OUTCOME: Patient tolerated treatment/procedure well without complication and is now ready for discharge.    DISPOSITION: Home or Self Care    FINAL DIAGNOSIS:  Atrial fibrillation    FOLLOWUP: In clinic    DISCHARGE INSTRUCTIONS:    Discharge Procedure Orders   Diet Cardiac     Lifting restrictions   Order Comments: Do not raise arm above shoulder height and no more then 15 pounds.     Notify your health care provider if you experience any of the following:  temperature >100.4     Notify your health care provider if you experience any of the following:  redness, tenderness, or signs of infection (pain, swelling, redness, odor or green/yellow discharge around incision site)        TIME SPENT ON DISCHARGE: 15 minutes

## 2025-07-07 NOTE — ANESTHESIA PREPROCEDURE EVALUATION
07/07/2025  Jen Fitzgerald is a 79 y.o., female.      Past Medical History:   Diagnosis Date    Anxiety disorder, unspecified     Essential (primary) hypertension     Hypothyroidism, unspecified     Osteopenia     Vitamin D deficiency      Past Surgical History:   Procedure Laterality Date    APPENDECTOMY      CHOLECYSTECTOMY      HYSTERECTOMY      TONSILLECTOMY      TONSILLECTOMY       Current Outpatient Medications   Medication Instructions    amLODIPine (NORVASC) 5 MG tablet TAKE ONE TABLET BY MOUTH EACH MORNING    apixaban (ELIQUIS) 5 mg, Oral, 2 times daily    ARMOUR THYROID 60 mg Tab TAKE ONE TABLET BY MOUTH EACH MORNING    busPIRone (BUSPAR) 5 mg, Daily    latanoprost 0.005 % ophthalmic solution 1 drop, Nightly    losartan (COZAAR) 100 MG tablet TAKE ONE TABLET BY MOUTH DAILY    metoprolol succinate (TOPROL-XL) 75 mg, Oral, Daily    NON FORMULARY MEDICATION Daily    thyroid (pork) (NP THYROID) 1 mg, Before breakfast    vitamin D (VITAMIN D3) 5,000 Units, Daily     4/8/25 TTE:    Left Ventricle: The left ventricle is normal in size. Normal wall thickness. There is mildly reduced systolic function with a visually estimated ejection fraction of 45 - 50%. Unable to assess diastolic function due to atrial fibrillation.    Right Ventricle: The right ventricle is normal in size. Systolic function is reduced.    Left Atrium: Mildly dilated    Right Atrium: Right atrium is mildly dilated.    Aortic Valve: There is no stenosis. There is mild aortic regurgitation.    Mitral Valve: There is mild to moderate regurgitation.    Tricuspid Valve: There is mild regurgitation.    IVC/SVC: Normal venous pressure at 3 mmHg.    Pericardium: There is no pericardial effusion.       Pre-op Assessment    I have reviewed the Patient Summary Reports.     I have reviewed the Nursing Notes. I have reviewed the NPO Status.   I have  reviewed the Medications.     Review of Systems  Cardiovascular:     Hypertension    Dysrhythmias                                      Endocrine:   Hypothyroidism          Psych:   anxiety                 Physical Exam  General: Cooperative, Alert and Oriented    Airway:  Mallampati: II   Mouth Opening: Small, but > 3cm  TM Distance: Normal  Tongue: Normal  Neck ROM: Normal ROM    Dental:  Dentures        Anesthesia Plan  Type of Anesthesia, risks & benefits discussed:    Anesthesia Type: Gen Natural Airway  Intra-op Monitoring Plan: Standard ASA Monitors  Informed Consent: Informed consent signed with the Patient and all parties understand the risks and agree with anesthesia plan.  All questions answered.   ASA Score: 2  Day of Surgery Review of History & Physical: H&P Update referred to the surgeon/provider.I have interviewed and examined the patient. I have reviewed the patient's H&P dated: There are no significant changes.     Ready For Surgery From Anesthesia Perspective.     .

## 2025-07-10 ENCOUNTER — HOSPITAL ENCOUNTER (INPATIENT)
Facility: HOSPITAL | Age: 80
LOS: 1 days | Discharge: HOME | DRG: 065 | End: 2025-07-12
Attending: STUDENT IN AN ORGANIZED HEALTH CARE EDUCATION/TRAINING PROGRAM | Admitting: INTERNAL MEDICINE
Payer: MEDICARE

## 2025-07-10 DIAGNOSIS — R42 DIZZINESS: ICD-10-CM

## 2025-07-10 DIAGNOSIS — I63.9 STROKE: ICD-10-CM

## 2025-07-10 DIAGNOSIS — R42 VERTIGO: Primary | ICD-10-CM

## 2025-07-10 LAB
ALBUMIN SERPL-MCNC: 4.1 G/DL (ref 3.4–4.8)
ALBUMIN/GLOB SERPL: 1.1 RATIO (ref 1.1–2)
ALP SERPL-CCNC: 96 UNIT/L (ref 40–150)
ALT SERPL-CCNC: 20 UNIT/L (ref 0–55)
ANION GAP SERPL CALC-SCNC: 11 MEQ/L
APTT PPP: 27.3 SECONDS (ref 23.2–33.7)
AST SERPL-CCNC: 22 UNIT/L (ref 11–45)
BACTERIA #/AREA URNS AUTO: ABNORMAL /HPF
BASOPHILS # BLD AUTO: 0.05 X10(3)/MCL
BASOPHILS NFR BLD AUTO: 0.7 %
BILIRUB SERPL-MCNC: 0.8 MG/DL
BILIRUB UR QL STRIP.AUTO: NEGATIVE
BUN SERPL-MCNC: 11.6 MG/DL (ref 9.8–20.1)
CALCIUM SERPL-MCNC: 9.6 MG/DL (ref 8.4–10.2)
CHLORIDE SERPL-SCNC: 107 MMOL/L (ref 98–107)
CHOLEST SERPL-MCNC: 166 MG/DL
CHOLEST/HDLC SERPL: 3 {RATIO} (ref 0–5)
CLARITY UR: CLEAR
CO2 SERPL-SCNC: 24 MMOL/L (ref 23–31)
COLOR UR AUTO: COLORLESS
CREAT SERPL-MCNC: 0.67 MG/DL (ref 0.55–1.02)
CREAT/UREA NIT SERPL: 17
EOSINOPHIL # BLD AUTO: 0.18 X10(3)/MCL (ref 0–0.9)
EOSINOPHIL NFR BLD AUTO: 2.6 %
ERYTHROCYTE [DISTWIDTH] IN BLOOD BY AUTOMATED COUNT: 14.8 % (ref 11.5–17)
EST. AVERAGE GLUCOSE BLD GHB EST-MCNC: 122.6 MG/DL
GFR SERPLBLD CREATININE-BSD FMLA CKD-EPI: >60 ML/MIN/1.73/M2
GLOBULIN SER-MCNC: 3.6 GM/DL (ref 2.4–3.5)
GLUCOSE SERPL-MCNC: 88 MG/DL (ref 82–115)
GLUCOSE UR QL STRIP: NORMAL
HBA1C MFR BLD: 5.9 %
HCT VFR BLD AUTO: 47.1 % (ref 37–47)
HDLC SERPL-MCNC: 56 MG/DL (ref 35–60)
HGB BLD-MCNC: 15.2 G/DL (ref 12–16)
HGB UR QL STRIP: NEGATIVE
IMM GRANULOCYTES # BLD AUTO: 0.02 X10(3)/MCL (ref 0–0.04)
IMM GRANULOCYTES NFR BLD AUTO: 0.3 %
INR PPP: 0.9
KETONES UR QL STRIP: NEGATIVE
LDLC SERPL CALC-MCNC: 97 MG/DL (ref 50–140)
LEUKOCYTE ESTERASE UR QL STRIP: 25
LYMPHOCYTES # BLD AUTO: 1.75 X10(3)/MCL (ref 0.6–4.6)
LYMPHOCYTES NFR BLD AUTO: 25 %
MCH RBC QN AUTO: 26.4 PG (ref 27–31)
MCHC RBC AUTO-ENTMCNC: 32.3 G/DL (ref 33–36)
MCV RBC AUTO: 81.9 FL (ref 80–94)
MONOCYTES # BLD AUTO: 0.67 X10(3)/MCL (ref 0.1–1.3)
MONOCYTES NFR BLD AUTO: 9.6 %
NEUTROPHILS # BLD AUTO: 4.33 X10(3)/MCL (ref 2.1–9.2)
NEUTROPHILS NFR BLD AUTO: 61.8 %
NITRITE UR QL STRIP: NEGATIVE
NRBC BLD AUTO-RTO: 0 %
PH UR STRIP: 7 [PH]
PLATELET # BLD AUTO: 287 X10(3)/MCL (ref 130–400)
PMV BLD AUTO: 8.9 FL (ref 7.4–10.4)
POTASSIUM SERPL-SCNC: 4 MMOL/L (ref 3.5–5.1)
PROT SERPL-MCNC: 7.7 GM/DL (ref 5.8–7.6)
PROT UR QL STRIP: NEGATIVE
PROTHROMBIN TIME: 12.6 SECONDS (ref 12.5–14.5)
RBC # BLD AUTO: 5.75 X10(6)/MCL (ref 4.2–5.4)
RBC #/AREA URNS AUTO: ABNORMAL /HPF
SODIUM SERPL-SCNC: 142 MMOL/L (ref 136–145)
SP GR UR STRIP.AUTO: 1 (ref 1–1.03)
SQUAMOUS #/AREA URNS LPF: ABNORMAL /HPF
TRIGL SERPL-MCNC: 65 MG/DL (ref 37–140)
TROPONIN I SERPL-MCNC: <0.01 NG/ML (ref 0–0.04)
TSH SERPL-ACNC: 0.99 UIU/ML (ref 0.35–4.94)
UROBILINOGEN UR STRIP-ACNC: NORMAL
VLDLC SERPL CALC-MCNC: 13 MG/DL
WBC # BLD AUTO: 7 X10(3)/MCL (ref 4.5–11.5)
WBC #/AREA URNS AUTO: ABNORMAL /HPF

## 2025-07-10 PROCEDURE — 25000003 PHARM REV CODE 250: Performed by: PHYSICIAN ASSISTANT

## 2025-07-10 PROCEDURE — 93010 ELECTROCARDIOGRAM REPORT: CPT | Mod: ,,, | Performed by: INTERNAL MEDICINE

## 2025-07-10 PROCEDURE — 85610 PROTHROMBIN TIME: CPT | Performed by: PHYSICIAN ASSISTANT

## 2025-07-10 PROCEDURE — 84484 ASSAY OF TROPONIN QUANT: CPT | Performed by: PHYSICIAN ASSISTANT

## 2025-07-10 PROCEDURE — 96360 HYDRATION IV INFUSION INIT: CPT

## 2025-07-10 PROCEDURE — 83036 HEMOGLOBIN GLYCOSYLATED A1C: CPT

## 2025-07-10 PROCEDURE — 80061 LIPID PANEL: CPT

## 2025-07-10 PROCEDURE — 84443 ASSAY THYROID STIM HORMONE: CPT

## 2025-07-10 PROCEDURE — 93005 ELECTROCARDIOGRAM TRACING: CPT

## 2025-07-10 PROCEDURE — 85025 COMPLETE CBC W/AUTO DIFF WBC: CPT | Performed by: PHYSICIAN ASSISTANT

## 2025-07-10 PROCEDURE — 85730 THROMBOPLASTIN TIME PARTIAL: CPT | Performed by: PHYSICIAN ASSISTANT

## 2025-07-10 PROCEDURE — 25500020 PHARM REV CODE 255: Performed by: INTERNAL MEDICINE

## 2025-07-10 PROCEDURE — 80053 COMPREHEN METABOLIC PANEL: CPT | Performed by: PHYSICIAN ASSISTANT

## 2025-07-10 PROCEDURE — G0378 HOSPITAL OBSERVATION PER HR: HCPCS

## 2025-07-10 PROCEDURE — 81001 URINALYSIS AUTO W/SCOPE: CPT | Performed by: PHYSICIAN ASSISTANT

## 2025-07-10 PROCEDURE — 99285 EMERGENCY DEPT VISIT HI MDM: CPT | Mod: 25

## 2025-07-10 RX ORDER — BISACODYL 10 MG/1
10 SUPPOSITORY RECTAL DAILY PRN
Status: DISCONTINUED | OUTPATIENT
Start: 2025-07-10 | End: 2025-07-12 | Stop reason: HOSPADM

## 2025-07-10 RX ORDER — LABETALOL HYDROCHLORIDE 5 MG/ML
10 INJECTION, SOLUTION INTRAVENOUS
Status: DISCONTINUED | OUTPATIENT
Start: 2025-07-10 | End: 2025-07-11

## 2025-07-10 RX ORDER — SODIUM CHLORIDE 0.9 % (FLUSH) 0.9 %
10 SYRINGE (ML) INJECTION
Status: DISCONTINUED | OUTPATIENT
Start: 2025-07-10 | End: 2025-07-12 | Stop reason: HOSPADM

## 2025-07-10 RX ORDER — ATORVASTATIN CALCIUM 40 MG/1
40 TABLET, FILM COATED ORAL DAILY
Status: DISCONTINUED | OUTPATIENT
Start: 2025-07-11 | End: 2025-07-12 | Stop reason: HOSPADM

## 2025-07-10 RX ORDER — ASPIRIN 81 MG/1
81 TABLET ORAL DAILY
Status: DISCONTINUED | OUTPATIENT
Start: 2025-07-11 | End: 2025-07-11

## 2025-07-10 RX ADMIN — IOHEXOL 84 ML: 350 INJECTION, SOLUTION INTRAVENOUS at 10:07

## 2025-07-10 RX ADMIN — SODIUM CHLORIDE 1000 ML: 9 INJECTION, SOLUTION INTRAVENOUS at 06:07

## 2025-07-10 NOTE — FIRST PROVIDER EVALUATION
"Medical screening examination initiated.  I have conducted a focused provider triage encounter, findings are as follows:  Chief Complaint   Patient presents with    Dizziness     Pt arrives POV via wheelchair with C/O dizziness and lightheadedness since Monday. Cardioversion on Monday. Denies falls/ trauma/ LOC. Denies SOB or CP in triage. Pt alert. GCS 15.        Brief history of present illness:  80 yo female presents to ED for evaluation of dizziness and in his after having her cardioversion on Monday.  Patient reports that her symptoms started after getting cardioverted for AFib.  Denies any shortness of breath or chest pain.  Denies any trauma or injury.    Vitals:    07/10/25 1642   BP: (!) 187/70   Pulse: 82   Resp: 20   Temp: 98.4 °F (36.9 °C)   TempSrc: Oral   SpO2: 97%   Weight: 81.2 kg (179 lb)   Height: 5' 3" (1.6 m)       Pertinent physical exam:  Patient is awake and alert and oriented.  Sitting in wheelchair.  In no acute distress.      Brief workup plan:  labs,EKG, CXR, IVF    Preliminary workup initiated; this workup will be continued and followed by the physician or advanced practice provider that is assigned to the patient when roomed.  "

## 2025-07-10 NOTE — Clinical Note
Diagnosis: Vertigo [[id number]]   Future Attending Provider: ESTEFANI BROWN [[id number]]   Admit to which facility:: [facility] [[id number]]

## 2025-07-11 PROBLEM — I63.9 STROKE: Status: ACTIVE | Noted: 2025-07-11

## 2025-07-11 LAB
ALBUMIN SERPL-MCNC: 3.7 G/DL (ref 3.4–4.8)
ALBUMIN/GLOB SERPL: 1.2 RATIO (ref 1.1–2)
ALP SERPL-CCNC: 86 UNIT/L (ref 40–150)
ALT SERPL-CCNC: 19 UNIT/L (ref 0–55)
ANION GAP SERPL CALC-SCNC: 11 MEQ/L
APICAL FOUR CHAMBER EJECTION FRACTION: 57 %
APICAL TWO CHAMBER EJECTION FRACTION: 51 %
AST SERPL-CCNC: 21 UNIT/L (ref 11–45)
AV INDEX (PROSTH): 0.6
AV MEAN GRADIENT: 3 MMHG
AV PEAK GRADIENT: 5 MMHG
AV VALVE AREA BY VELOCITY RATIO: 2.4 CM²
AV VALVE AREA: 2.3 CM²
AV VELOCITY RATIO: 0.64
BASOPHILS # BLD AUTO: 0.05 X10(3)/MCL
BASOPHILS NFR BLD AUTO: 0.6 %
BILIRUB SERPL-MCNC: 1 MG/DL
BSA FOR ECHO PROCEDURE: 1.9 M2
BUN SERPL-MCNC: 7.7 MG/DL (ref 9.8–20.1)
CALCIUM SERPL-MCNC: 9.1 MG/DL (ref 8.4–10.2)
CHLORIDE SERPL-SCNC: 108 MMOL/L (ref 98–107)
CO2 SERPL-SCNC: 21 MMOL/L (ref 23–31)
CREAT SERPL-MCNC: 0.64 MG/DL (ref 0.55–1.02)
CREAT/UREA NIT SERPL: 12
CV ECHO LV RWT: 0.44 CM
DOP CALC AO PEAK VEL: 1.1 M/S
DOP CALC AO VTI: 23.7 CM
DOP CALC LVOT AREA: 3.8 CM2
DOP CALC LVOT DIAMETER: 2.2 CM
DOP CALC LVOT PEAK VEL: 0.7 M/S
DOP CALC LVOT STROKE VOLUME: 54.3 CM3
DOP CALC MV VTI: 17.9 CM
DOP CALCLVOT PEAK VEL VTI: 14.3 CM
E WAVE DECELERATION TIME: 158 MSEC
E/A RATIO: 1.07
E/E' RATIO: 9 M/S
ECHO LV POSTERIOR WALL: 1 CM (ref 0.6–1.1)
EOSINOPHIL # BLD AUTO: 0.19 X10(3)/MCL (ref 0–0.9)
EOSINOPHIL NFR BLD AUTO: 2.2 %
ERYTHROCYTE [DISTWIDTH] IN BLOOD BY AUTOMATED COUNT: 14.8 % (ref 11.5–17)
FRACTIONAL SHORTENING: 31.1 % (ref 28–44)
GFR SERPLBLD CREATININE-BSD FMLA CKD-EPI: >60 ML/MIN/1.73/M2
GLOBULIN SER-MCNC: 3.2 GM/DL (ref 2.4–3.5)
GLUCOSE SERPL-MCNC: 94 MG/DL (ref 82–115)
HCT VFR BLD AUTO: 47.4 % (ref 37–47)
HGB BLD-MCNC: 15 G/DL (ref 12–16)
IMM GRANULOCYTES # BLD AUTO: 0.03 X10(3)/MCL (ref 0–0.04)
IMM GRANULOCYTES NFR BLD AUTO: 0.3 %
INTERVENTRICULAR SEPTUM: 1.1 CM (ref 0.6–1.1)
LEFT ATRIUM AREA SYSTOLIC (APICAL 2 CHAMBER): 20.3 CM2
LEFT ATRIUM AREA SYSTOLIC (APICAL 4 CHAMBER): 22.9 CM2
LEFT ATRIUM SIZE: 4.2 CM
LEFT ATRIUM VOLUME INDEX MOD: 35 ML/M2
LEFT ATRIUM VOLUME MOD: 64 ML
LEFT CCA DIST DIAS: 7 CM/S
LEFT CCA DIST SYS: 51 CM/S
LEFT CCA PROX DIAS: 10 CM/S
LEFT CCA PROX SYS: 72 CM/S
LEFT ECA DIAS: 8 CM/S
LEFT ECA SYS: 79 CM/S
LEFT ICA DIST DIAS: 22 CM/S
LEFT ICA DIST SYS: 117 CM/S
LEFT ICA MID DIAS: 19 CM/S
LEFT ICA MID SYS: 97 CM/S
LEFT ICA PROX DIAS: 19 CM/S
LEFT ICA PROX SYS: 90 CM/S
LEFT INTERNAL DIMENSION IN SYSTOLE: 3.1 CM (ref 2.1–4)
LEFT VENTRICLE DIASTOLIC VOLUME INDEX: 50 ML/M2
LEFT VENTRICLE DIASTOLIC VOLUME: 92 ML
LEFT VENTRICLE END DIASTOLIC VOLUME APICAL 2 CHAMBER: 59.6 ML
LEFT VENTRICLE END DIASTOLIC VOLUME APICAL 4 CHAMBER: 55.6 ML
LEFT VENTRICLE END SYSTOLIC VOLUME APICAL 2 CHAMBER: 58.1 ML
LEFT VENTRICLE END SYSTOLIC VOLUME APICAL 4 CHAMBER: 61.6 ML
LEFT VENTRICLE MASS INDEX: 89.1 G/M2
LEFT VENTRICLE SYSTOLIC VOLUME INDEX: 20.7 ML/M2
LEFT VENTRICLE SYSTOLIC VOLUME: 38 ML
LEFT VENTRICULAR INTERNAL DIMENSION IN DIASTOLE: 4.5 CM (ref 3.5–6)
LEFT VENTRICULAR MASS: 164 G
LEFT VERTEBRAL DIAS: 10 CM/S
LEFT VERTEBRAL SYS: 62 CM/S
LV LATERAL E/E' RATIO: 6.6 M/S
LV SEPTAL E/E' RATIO: 14.6 M/S
LVED V (TEICH): 92.4 ML
LVES V (TEICH): 37.9 ML
LVOT MG: 1 MMHG
LVOT MV: 0.47 CM/S
LYMPHOCYTES # BLD AUTO: 2.03 X10(3)/MCL (ref 0.6–4.6)
LYMPHOCYTES NFR BLD AUTO: 23.6 %
MAGNESIUM SERPL-MCNC: 2 MG/DL (ref 1.6–2.6)
MCH RBC QN AUTO: 26.4 PG (ref 27–31)
MCHC RBC AUTO-ENTMCNC: 31.6 G/DL (ref 33–36)
MCV RBC AUTO: 83.3 FL (ref 80–94)
MONOCYTES # BLD AUTO: 0.75 X10(3)/MCL (ref 0.1–1.3)
MONOCYTES NFR BLD AUTO: 8.7 %
MV MEAN GRADIENT: 2 MMHG
MV PEAK A VEL: 0.68 M/S
MV PEAK E VEL: 0.73 M/S
MV PEAK GRADIENT: 3 MMHG
MV VALVE AREA BY CONTINUITY EQUATION: 3.04 CM2
NEUTROPHILS # BLD AUTO: 5.54 X10(3)/MCL (ref 2.1–9.2)
NEUTROPHILS NFR BLD AUTO: 64.6 %
NRBC BLD AUTO-RTO: 0 %
OHS CV CAROTID RIGHT ICA EDV HIGHEST: 16
OHS CV CAROTID ULTRASOUND LEFT ICA/CCA RATIO: 2.29
OHS CV CAROTID ULTRASOUND RIGHT ICA/CCA RATIO: 1.28
OHS CV PV CAROTID LEFT HIGHEST CCA: 72
OHS CV PV CAROTID LEFT HIGHEST ICA: 117
OHS CV PV CAROTID RIGHT HIGHEST CCA: 77
OHS CV PV CAROTID RIGHT HIGHEST ICA: 77
OHS CV RV/LV RATIO: 0.71 CM
OHS CV US CAROTID LEFT HIGHEST EDV: 22
OHS LV EJECTION FRACTION SIMPSONS BIPLANE MOD: 54 %
OHS QRS DURATION: 88 MS
OHS QTC CALCULATION: 406 MS
PHOSPHATE SERPL-MCNC: 3 MG/DL (ref 2.3–4.7)
PISA TR MAX VEL: 2.6 M/S
PLATELET # BLD AUTO: 274 X10(3)/MCL (ref 130–400)
PMV BLD AUTO: 8.6 FL (ref 7.4–10.4)
POTASSIUM SERPL-SCNC: 3.8 MMOL/L (ref 3.5–5.1)
PROT SERPL-MCNC: 6.9 GM/DL (ref 5.8–7.6)
RA MAJOR: 6 CM
RA WIDTH: 3.8 CM
RBC # BLD AUTO: 5.69 X10(6)/MCL (ref 4.2–5.4)
RIGHT CCA DIST DIAS: 6 CM/S
RIGHT CCA DIST SYS: 60 CM/S
RIGHT CCA PROX DIAS: 8 CM/S
RIGHT CCA PROX SYS: 77 CM/S
RIGHT ECA DIAS: 0 CM/S
RIGHT ECA SYS: 70 CM/S
RIGHT ICA DIST DIAS: 10 CM/S
RIGHT ICA DIST SYS: 77 CM/S
RIGHT ICA MID DIAS: 16 CM/S
RIGHT ICA MID SYS: 75 CM/S
RIGHT ICA PROX DIAS: 5 CM/S
RIGHT ICA PROX SYS: 62 CM/S
RIGHT VENTRICLE DIASTOLIC BASEL DIMENSION: 3.2 CM
RIGHT VENTRICULAR END-DIASTOLIC DIMENSION: 3.2 CM
RIGHT VERTEBRAL DIAS: 6 CM/S
RIGHT VERTEBRAL SYS: 43 CM/S
SODIUM SERPL-SCNC: 140 MMOL/L (ref 136–145)
TDI LATERAL: 0.11 M/S
TDI SEPTAL: 0.05 M/S
TDI: 0.08 M/S
TR MAX PG: 27 MMHG
TRICUSPID ANNULAR PLANE SYSTOLIC EXCURSION: 1.6 CM
WBC # BLD AUTO: 8.59 X10(3)/MCL (ref 4.5–11.5)
Z-SCORE OF LEFT VENTRICULAR DIMENSION IN END DIASTOLE: -1.26
Z-SCORE OF LEFT VENTRICULAR DIMENSION IN END SYSTOLE: -0.13

## 2025-07-11 PROCEDURE — 25000003 PHARM REV CODE 250

## 2025-07-11 PROCEDURE — 83735 ASSAY OF MAGNESIUM: CPT

## 2025-07-11 PROCEDURE — 84100 ASSAY OF PHOSPHORUS: CPT

## 2025-07-11 PROCEDURE — 97161 PT EVAL LOW COMPLEX 20 MIN: CPT

## 2025-07-11 PROCEDURE — 21400001 HC TELEMETRY ROOM

## 2025-07-11 PROCEDURE — 80053 COMPREHEN METABOLIC PANEL: CPT

## 2025-07-11 PROCEDURE — 25000003 PHARM REV CODE 250: Performed by: INTERNAL MEDICINE

## 2025-07-11 PROCEDURE — 85025 COMPLETE CBC W/AUTO DIFF WBC: CPT

## 2025-07-11 PROCEDURE — 11000001 HC ACUTE MED/SURG PRIVATE ROOM

## 2025-07-11 PROCEDURE — 97165 OT EVAL LOW COMPLEX 30 MIN: CPT

## 2025-07-11 RX ORDER — BUSPIRONE HYDROCHLORIDE 15 MG/1
15 TABLET ORAL DAILY
COMMUNITY

## 2025-07-11 RX ORDER — METOPROLOL SUCCINATE 100 MG/1
100 TABLET, EXTENDED RELEASE ORAL DAILY
COMMUNITY
Start: 2025-05-05

## 2025-07-11 RX ORDER — BUSPIRONE HYDROCHLORIDE 5 MG/1
5 TABLET ORAL 3 TIMES DAILY
COMMUNITY

## 2025-07-11 RX ORDER — LOSARTAN POTASSIUM 50 MG/1
50 TABLET ORAL DAILY
Status: DISCONTINUED | OUTPATIENT
Start: 2025-07-12 | End: 2025-07-12

## 2025-07-11 RX ORDER — METOPROLOL SUCCINATE 50 MG/1
100 TABLET, EXTENDED RELEASE ORAL DAILY
Status: DISCONTINUED | OUTPATIENT
Start: 2025-07-12 | End: 2025-07-12 | Stop reason: HOSPADM

## 2025-07-11 RX ORDER — HYDRALAZINE HYDROCHLORIDE 20 MG/ML
5 INJECTION INTRAMUSCULAR; INTRAVENOUS EVERY 6 HOURS PRN
Status: DISCONTINUED | OUTPATIENT
Start: 2025-07-11 | End: 2025-07-12 | Stop reason: HOSPADM

## 2025-07-11 RX ORDER — LOSARTAN POTASSIUM 100 MG/1
100 TABLET ORAL DAILY
COMMUNITY
End: 2025-07-14 | Stop reason: ALTCHOICE

## 2025-07-11 RX ORDER — THYROID 30 MG/1
60 TABLET ORAL
Status: DISCONTINUED | OUTPATIENT
Start: 2025-07-12 | End: 2025-07-12 | Stop reason: HOSPADM

## 2025-07-11 RX ORDER — BUSPIRONE HYDROCHLORIDE 5 MG/1
5 TABLET ORAL 3 TIMES DAILY
Status: DISCONTINUED | OUTPATIENT
Start: 2025-07-11 | End: 2025-07-12 | Stop reason: HOSPADM

## 2025-07-11 RX ORDER — AMLODIPINE BESYLATE 5 MG/1
5 TABLET ORAL DAILY
Status: DISCONTINUED | OUTPATIENT
Start: 2025-07-12 | End: 2025-07-12 | Stop reason: HOSPADM

## 2025-07-11 RX ORDER — AMLODIPINE BESYLATE 5 MG/1
5 TABLET ORAL ONCE
Status: COMPLETED | OUTPATIENT
Start: 2025-07-11 | End: 2025-07-11

## 2025-07-11 RX ADMIN — APIXABAN 5 MG: 5 TABLET, FILM COATED ORAL at 08:07

## 2025-07-11 RX ADMIN — AMLODIPINE BESYLATE 5 MG: 5 TABLET ORAL at 08:07

## 2025-07-11 RX ADMIN — APIXABAN 5 MG: 5 TABLET, FILM COATED ORAL at 10:07

## 2025-07-11 RX ADMIN — ATORVASTATIN CALCIUM 40 MG: 40 TABLET, FILM COATED ORAL at 08:07

## 2025-07-11 RX ADMIN — BUSPIRONE HYDROCHLORIDE 5 MG: 5 TABLET ORAL at 08:07

## 2025-07-11 RX ADMIN — ASPIRIN 81 MG: 81 TABLET, COATED ORAL at 08:07

## 2025-07-11 NOTE — ASSESSMENT & PLAN NOTE
- presented with HA, dizziness, lightheadedness  - Stroke RF: HTN, Afib  - Intervention: OOW  - Etiology: concerning for embolic vs post procedural    Stroke workup:  -CTh: no acute abnormality   -CTA h/n: Very minimal calcification in the cavernous carotids bilaterally without flow limiting stenosis.    -MRI brain: . A few small foci of restricted diffusion which are dark on ADC map and bright on FLAIR, consistent with acute infarcts are seen in the subcortical right occipital lobe as well as in the right cerebellar hemisphere   -ECHO:  pending  -CUS: The right internal carotid artery is patent with less than 50% stenosis.  The left internal carotid artery is patent with less than 50% stenosis.  Tortuosity noted to cause increase in velocities without visualization of stenotic lesion.  Bilateral vertebral arteries are patent with antegrade flow.   -LDL: 97.00   -A1c: 5.9   -TSH: 0.994   -home medications include:  NIH Stroke Scale      1a  Level of consciousness: 0=alert; keenly responsive   1b. LOC questions:  0=Answers both tasks correctly   1c. LOC commands: 0=Answers both tasks correctly   2.  Best Gaze: 0=normal   3.  Visual: 0=No visual loss   4. Facial Palsy: 0=Normal symmetric movement   5a.  Motor left arm: 0=No drift, limb holds 90 (or 45) degrees for full 10 seconds   5b.  Motor right arm: 0=No drift, limb holds 90 (or 45) degrees for full 10 seconds   6a. motor left le=No drift, limb holds 90 (or 45) degrees for full 10 seconds   6b  Motor right le=No drift, limb holds 90 (or 45) degrees for full 10 seconds   7. Limb Ataxia: 0=Absent   8.  Sensory: 0=Normal; no sensory loss   9. Best Language:  0=No aphasia, normal   10. Dysarthria: 0=Normal   11. Extinction and Inattention: 0=No abnormality   12. Distal motor function: 0=Normal    Total:   0         Plan:  Recommend to continue Eliquis from a stroke stand point. (If cardiology is wanting to stop anticoagulation following this cardioversion,  which I do not see in any notes),we would recommend DAPT x 21 days followed by aspirin monotherapy. No need for further cardiac imaging if continuing home Eliquis.   - Medications for secondary stroke prevention.   -continue Anticoagulants: Apixaban 5 mg BID  . Ok to start now  -continue Atorvastatin 40mg daily  - PT/OT/speech therapy evaluation and recommendations  - Based on AHA/ACC 2021 guidelines, patient primary care doctor should target BP goal < 130 mm/hg, LDL-C < 70 mg/dl, and HBA1c of < 7% to minimize the risk of future strokes.  - Lovenox SQ for DVT prophylaxis   - OOW for permissive hypertension ok to normalize blood pressure  - Neuro checks q4hr ... stat CTh if any neuro change   - Continuous telemetry monitoring  - No bedrest, ok for patient to ambulate, RN to observe first ambulation for safety  - Patient will need follow up in stroke clinic with Treva Watt NP in 3 months.   - Written patient-specific education provided to patient.   - Thank you for allowing us to participate in this patient's care, we will sign-off.          Patient/Family Stroke Education:  -Written information on below given to patient:   -I have discussed the patient's stroke risk factors and the importance to modify them in order to reduce the risk of future events.  Also, the importance of a healthy diet and daily exercise in order to reduce the risk of future strokes.    --Encourage medication compliance  --Life Style Factors: healthy diet and physical activity, encourage low salt, and Mediterranean Diet  --Smoking Cessation  --Avoid Excessive Alcohol consumption  --Evaluate/encourage compliance related to RADHAMES  --**To include other RF such as: CAD, SID, AF, and hypercoagulable states as applicable.  -I went over the usual stroke warning signs and symptoms, and the need to activate EMS (call 911) as soon as the symptoms present.  Sudden onset numbness or weakness of the face, arm, or leg; especially on one side of the  body  Sudden confusion, trouble speaking, or understanding  Sudden trouble seeing in one or both eyes  Sudden trouble walking, dizziness, loss of coordination  Sudden severe headache with no known cause  -Discussed prescribed medication regime with the patient, including the indication for the medications as well as their potential side effects, and what to do in case they appear.

## 2025-07-11 NOTE — SUBJECTIVE & OBJECTIVE
Past Medical History:   Diagnosis Date    Anxiety disorder, unspecified     Essential (primary) hypertension     Hypothyroidism, unspecified     Osteopenia     Vitamin D deficiency        Past Surgical History:   Procedure Laterality Date    APPENDECTOMY      CHOLECYSTECTOMY      HYSTERECTOMY      TONSILLECTOMY      TONSILLECTOMY         Review of patient's allergies indicates:   Allergen Reactions    Potassium chloride Other (See Comments)     Passes out       Current Neurological Medications:     No current facility-administered medications on file prior to encounter.     Current Outpatient Medications on File Prior to Encounter   Medication Sig    amLODIPine (NORVASC) 5 MG tablet TAKE ONE TABLET BY MOUTH EACH MORNING    apixaban (ELIQUIS) 5 mg Tab Take 1 tablet (5 mg total) by mouth 2 (two) times daily.    ARMOUR THYROID 60 mg Tab TAKE ONE TABLET BY MOUTH EACH MORNING    busPIRone (BUSPAR) 15 MG tablet Take 15 mg by mouth Daily.    busPIRone (BUSPAR) 5 MG Tab Take 5 mg by mouth 3 (three) times daily.    losartan (COZAAR) 100 MG tablet Take 100 mg by mouth once daily.    metoprolol succinate (TOPROL-XL) 100 MG 24 hr tablet Take 100 mg by mouth once daily.    thyroid, pork, (NP THYROID) 15 mg Tab Take 1 mg by mouth before breakfast.    vitamin D (VITAMIN D3) 1000 units Tab Take 5,000 Units by mouth every other day.    sotaloL (BETAPACE) 80 MG tablet Take 80 mg by mouth 2 (two) times daily.     Family History       Problem Relation (Age of Onset)    Asthma Mother    Cancer Father    Diabetes Father    Hypertension Mother          Tobacco Use    Smoking status: Never    Smokeless tobacco: Never   Substance and Sexual Activity    Alcohol use: Not Currently    Drug use: Never    Sexual activity: Not on file     Review of Systems   All other systems reviewed and are negative.    Objective:     Vital Signs (Most Recent):  Temp: 98 °F (36.7 °C) (07/11/25 0402)  Pulse: 60 (07/11/25 0601)  Resp: 15 (07/11/25 0602)  BP: (!)  153/67 (07/11/25 0602)  SpO2: 95 % (07/11/25 0602) Vital Signs (24h Range):  Temp:  [98 °F (36.7 °C)-98.4 °F (36.9 °C)] 98 °F (36.7 °C)  Pulse:  [56-82] 60  Resp:  [15-20] 15  SpO2:  [95 %-98 %] 95 %  BP: (153-187)/(67-99) 153/67     Weight: 81.2 kg (179 lb)  Body mass index is 31.71 kg/m².     Physical Exam  Constitutional:       Appearance: She is obese.   HENT:      Head: Normocephalic and atraumatic.      Nose: Nose normal.      Mouth/Throat:      Mouth: Mucous membranes are dry.   Eyes:      Extraocular Movements: Extraocular movements intact.      Pupils: Pupils are equal, round, and reactive to light.   Pulmonary:      Effort: Pulmonary effort is normal.   Musculoskeletal:         General: Normal range of motion.      Cervical back: Normal range of motion and neck supple.   Skin:     General: Skin is warm and dry.      Capillary Refill: Capillary refill takes less than 2 seconds.   Neurological:      General: No focal deficit present.      Mental Status: She is alert and oriented to person, place, and time.   Psychiatric:         Mood and Affect: Mood normal.          NEUROLOGICAL EXAMINATION:     MENTAL STATUS   Oriented to person, place, and time.     CRANIAL NERVES     CN III, IV, VI   Pupils are equal, round, and reactive to light.      Significant Labs: All pertinent lab results from the past 24 hours have been reviewed.    Significant Imaging: I have reviewed all pertinent imaging results/findings within the past 24 hours.
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
## 2025-07-11 NOTE — H&P
[facility] Hospital Medicine History & Physical Examination       Patient Name: Jen Fitzgerald  MRN: [id number]  Patient Class: OP- Observation   Admission Date: 7/10/2025   Admitting Physician: BRITTNEY Service   Length of Stay: 0  Attending Physician: Dee Gloria MD  Primary Care Provider: Blayne Bautista MD  Face-to-Face encounter date: 07/11/2025  Code Status:dnr  Chief Complaint: Dizziness (Pt arrives POV via wheelchair with C/O dizziness and lightheadedness since Monday. Cardioversion on Monday. Denies falls/ trauma/ LOC. Denies SOB or CP in triage. Pt alert. GCS 15. )      Screening for Social Drivers for health:  Patient screened for food insecurity, housing instability, transportation needs, utility difficulties, and interpersonal safety (select all that apply as identified as concern)  []Housing or Food  []Transportation Needs  []Utility Difficulties  []Interpersonal safety  [x]None      Patient information was obtained from patient, patient's family, past medical records and ER records.  ED records were reviewed in detail and documented below    HISTORY OF PRESENT ILLNESS:   Jen Fitzgerald is a 79 y.o. female who  has a past medical history of Anxiety disorder, unspecified, Essential (primary) hypertension, Hypothyroidism, unspecified, Osteopenia, and Vitamin D deficiency.. The patient presented to [facility] on 7/10/2025 with a primary complaint of dizziness.  Patient reported cardioversion on Monday and has had episode of dizziness lightheadedness and headaches since procedure .  Denies any loss of consciousness.  Denies any weakness, numbness, vision changes, speech difficultes.  Denies any chest pain or shortness on breath or any palpitations.  Denies any fevers or chills.  Denies any pain anywhere else or any other complaints.    On arrival, afebrile, mildly hypertensive, on room air.  Stroke workup was ordered. CTh: no acute abnormality   CTA h/n: Very minimal calcification  in the cavernous carotids bilaterally without flow limiting stenosis.  MRI brain: . A few small foci of restricted diffusion which are dark on ADC map and bright on FLAIR, consistent with acute infarcts are seen in the subcortical right occipital lobe as well as in the right cerebellar hemisphere.  Neurology was consulted.  Admitted to Hospital Medicine for further evaluation .    PAST MEDICAL HISTORY:     Past Medical History:   Diagnosis Date    Anxiety disorder, unspecified     Essential (primary) hypertension     Hypothyroidism, unspecified     Osteopenia     Vitamin D deficiency        PAST SURGICAL HISTORY:     Past Surgical History:   Procedure Laterality Date    APPENDECTOMY      CHOLECYSTECTOMY      HYSTERECTOMY      TONSILLECTOMY      TONSILLECTOMY         ALLERGIES:   Potassium chloride    FAMILY HISTORY:   Reviewed and negative    SOCIAL HISTORY:     Social History     Tobacco Use    Smoking status: Never    Smokeless tobacco: Never   Substance Use Topics    Alcohol use: Not Currently        HOME MEDICATIONS:     Prior to Admission medications    Medication Sig Start Date End Date Taking? Authorizing Provider   amLODIPine (NORVASC) 5 MG tablet TAKE ONE TABLET BY MOUTH EACH MORNING 8/21/24  Yes Blayne Bautista MD   apixaban (ELIQUIS) 5 mg Tab Take 1 tablet (5 mg total) by mouth 2 (two) times daily. 4/24/25  Yes Blayne Bautista MD   ARMOUR THYROID 60 mg Tab TAKE ONE TABLET BY MOUTH EACH MORNING 8/19/24  Yes Blayne Bautista MD   busPIRone (BUSPAR) 15 MG tablet Take 15 mg by mouth Daily.   Yes Provider, Historical   busPIRone (BUSPAR) 5 MG Tab Take 5 mg by mouth 3 (three) times daily.   Yes Provider, Historical   losartan (COZAAR) 100 MG tablet Take 100 mg by mouth once daily.   Yes Provider, Historical   metoprolol succinate (TOPROL-XL) 100 MG 24 hr tablet Take 100 mg by mouth once daily. 5/5/25  Yes Provider, Historical   thyroid, pork, (NP THYROID) 15 mg Tab Take 1 mg by mouth before  breakfast.   Yes Provider, Historical   vitamin D (VITAMIN D3) 1000 units Tab Take 5,000 Units by mouth every other day.   Yes Provider, Historical   sotaloL (BETAPACE) 80 MG tablet Take 80 mg by mouth 2 (two) times daily. 6/25/25   Provider, Historical       REVIEW OF SYSTEMS:   Except as documented, all other systems reviewed and negative     PHYSICAL EXAM:     VITAL SIGNS: 24 HRS MIN & MAX LAST   Temp  Min: 98 °F (36.7 °C)  Max: 98.4 °F (36.9 °C) 98 °F (36.7 °C)   BP  Min: 153/67  Max: 187/70 (!) 153/67   Pulse  Min: 56  Max: 83  83   Resp  Min: 15  Max: 20 18   SpO2  Min: 95 %  Max: 98 % 96 %     General appearance: Well-developed, well-nourished female in no apparent distress.  HENT: Atraumatic head. Moist mucous membranes of oral cavity.  Eyes: Normal extraocular movements.   Neck: Supple.   Lungs: Clear to auscultation bilaterally. No wheezing present.   Heart:  S1 and S2 present with no appreciable murmurs/gallop/rub.   Abdomen: Soft, non-distended, non-tender. No rebound tenderness/guarding. Bowel sounds are normal.   Extremities: No cyanosis, clubbing, or edema.  Skin: No Rash.   Neuro: Motor and sensory exams grossly intact. Good tone. Muscle strength 5/5 in all 4 extremities  Psych/mental status: Appropriate mood and affect. Responds appropriately to questions.     LABS AND IMAGING:     Recent Labs   Lab 07/10/25  1817 07/11/25  0356   WBC 7.00 8.59   RBC 5.75* 5.69*   HGB 15.2 15.0   HCT 47.1* 47.4*   MCV 81.9 83.3   MCH 26.4* 26.4*   MCHC 32.3* 31.6*   RDW 14.8 14.8    274   MPV 8.9 8.6       Recent Labs   Lab 07/10/25  1817 07/11/25  0356    140   K 4.0 3.8    108*   CO2 24 21*   BUN 11.6 7.7*   CREATININE 0.67 0.64   GLU 88 94   CALCIUM 9.6 9.1   MG  --  2.00   ALBUMIN 4.1 3.7   PROT 7.7* 6.9   ALKPHOS 96 86   ALT 20 19   AST 22 21   BILITOT 0.8 1.0       Microbiology Results (last 7 days)       ** No results found for the last 168 hours. **             MRI Brain Without  Contrast  Narrative: EXAMINATION:  MRI BRAIN WITHOUT CONTRAST    CLINICAL HISTORY:  Transient ischemic attack (TIA);    TECHNIQUE:  Multiplanar multisequence MR imaging of the brain was performed without contrast.    COMPARISON:  CT head without contrast and CT angiogram head and neck 07/10/2025.    FINDINGS:  INTRACRANIAL: There are a few small foci of diffusion restriction in FLAIR hyperintense signal noted within the right occipital lobe and superior right cerebellar hemisphere which are most consistent with acute ischemic infarcts.  Mild chronic small-vessel ischemic changes of the supratentorial periventricular and subcortical white matter.  Mild generalized brain atrophy.  No evidence of intracranial hemorrhage.  No extra-axial fluid collection or mass.  No intracranial mass effect.  No hydrocephalus.  Midline structures have a normal configuration.  Visualized pituitary gland and infundibulum are normal.  Visualized major intracranial vascular structures demonstrate normal flow voids and are normal in course and caliber.    SINUSES: Mild nodular mucosal thickening versus small mucous retention cyst in the maxillary sinuses bilaterally, left sphenoid sinus, and right ethmoid sinus.  Trace bilateral mastoid fluid.    ORBITS: Visualized orbits are normal.  Impression: There are few small acute ischemic infarcts noted in the right occipital lobe and superior right cerebellar hemisphere.    I agree with the preliminary report.    Electronically signed by: Colin Ruiz  Date:    07/11/2025  Time:    06:36  CTA Head and Neck (xpd)  Narrative: EXAMINATION:  CTA HEAD AND NECK (XPD)    CLINICAL HISTORY:  Stroke/TIA, determine embolic source;    TECHNIQUE:  Axial images of the head and neck were obtained with IV contrast administration.  Coronal and sagittal reconstructions were provided.  Three dimensional and MIP images were obtained and evaluated.  Total DLP was 1562 mGy-cm. Dose lowering technique and automated  exposure control were utilized for this exam.The degree of carotid stenosis was evaluated using NASCET criteria.    COMPARISON:  CT of the head 07/10/2025.    FINDINGS:  Vascular findings:    There is a bovine configuration of the aortic arch.  The brachiocephalic artery is normal.  The right common carotid artery is normal.  The right cervical ICA is normal.  The left common carotid artery is normal.  The left cervical ICA is normal.  The dominant left and non dominant right vertebral arteries are normal throughout their cervical courses.  The intradural vertebral arteries are normal.  The basilar artery is normal.  The petrous and cavernous carotids are normal.  The lyutaj-ug-Cgoger is intact.  There is no aneurysm.  There is no focal stenosis.  There is no paucity of vasculature.  The dural venous sinuses are widely patent.    Nonvascular findings:    There is no hemorrhage, hydrocephalus, or midline shift.  There is no abnormal intracranial enhancement.  The bilateral orbits are normal.  There is minimal mucoperiosteal thickening of the bilateral maxillary sinuses.  The nasopharynx and oropharynx are widely patent.  There is no cervical adenopathy.  The visualized lung apices are clear.  Impression: Very minimal calcification in the cavernous carotids bilaterally without flow limiting stenosis.    [employer] concordance    Electronically signed by: Leoncio Sewell MD  Date:    07/11/2025  Time:    04:24      ASSESSMENT & PLAN:   CVA  Etiology: concerning for embolic vs post procedural  History of hypertension, Afib, hypothyroidism      Plan   Neurology was consulted, follow up on further recommendations regarding antiplatelets.  Continue atorvastatin  Continue neuro checks, blood pressure parameters per Neurology  Follow up on stroke workup, echocardiogram pending, continue to monitor on telemetry  PTOT speech  Continue supportive care, appropriate home medications pending [facility] rec    Advanced Care planning-time 5  minutes  DNR    DVT prophylaxis:  Eliquis      Anticipated discharge and disposition-to be decided    __________________________________________________________________________  INPATIENT LIST OF MEDICATIONS     Scheduled Meds:   apixaban  5 mg Oral BID    atorvastatin  40 mg Oral Daily     Continuous Infusions:  PRN Meds:.  Current Facility-Administered Medications:     bisacodyL, 10 mg, Rectal, Daily PRN    labetalol, 10 mg, Intravenous, Q15 Min PRN    sodium chloride 0.9%, 10 mL, Intravenous, PRN        07/11/2025    ________________________________________________________________________________        Dee Gloria, MD   07/11/2025

## 2025-07-11 NOTE — CONSULTS
[facility] - Emergency Dept  Neurology  Consult Note    Patient Name: Jen Fitzgerald  MRN: [id number]  Admission Date: 7/10/2025  Hospital Length of Stay: 0 days  Code Status: Full Code   Attending Provider: Walker Petit MD   Consulting Provider: MEME Baugh  Primary Care Physician: Blayne Bautista MD  Principal Problem:<principal problem not specified>    Inpatient Consult to Neurology Services (General Neurology)  Consult performed by: Yumiko Bagley AGACNP-BC  Consult ordered by: Cory Gresham FNP         Subjective:     Chief Complaint:    Chief Complaint   Patient presents with    Dizziness     Pt arrives POV via wheelchair with C/O dizziness and lightheadedness since Monday. Cardioversion on Monday. Denies falls/ trauma/ LOC. Denies SOB or CP in triage. Pt alert. GCS 15.           HPI:   Patient is an 79-year-old female with a PMHx of anxiety, AFib, HTN, hypothyroidism, and osteopenia presented to the ER with complaints of dizziness. Per chart, patient reported cardioversion on Monday and has had episodes of dizziness, lightheadedness, and HA since procedure. No acute abnormality on CT head, CTA without LVO or flow limiting stenosis. MRI brain preliminary read revealing for right occipital and right cerebellar infarcts. Labs unremarkable. Hypertensive on admit with SBP in the 180's. Patient was admitted to [facility] and Stroke Neurology consulted for further evaluation. Patient home medication list's includes Eliquis.    .      Past Medical History:   Diagnosis Date    Anxiety disorder, unspecified     Essential (primary) hypertension     Hypothyroidism, unspecified     Osteopenia     Vitamin D deficiency        Past Surgical History:   Procedure Laterality Date    APPENDECTOMY      CHOLECYSTECTOMY      HYSTERECTOMY      TONSILLECTOMY      TONSILLECTOMY         Review of patient's allergies indicates:   Allergen Reactions    Potassium chloride Other (See  Comments)     Passes out       Current Neurological Medications:     No current facility-administered medications on file prior to encounter.     Current Outpatient Medications on File Prior to Encounter   Medication Sig    amLODIPine (NORVASC) 5 MG tablet TAKE ONE TABLET BY MOUTH EACH MORNING    apixaban (ELIQUIS) 5 mg Tab Take 1 tablet (5 mg total) by mouth 2 (two) times daily.    ARMOUR THYROID 60 mg Tab TAKE ONE TABLET BY MOUTH EACH MORNING    busPIRone (BUSPAR) 15 MG tablet Take 15 mg by mouth Daily.    busPIRone (BUSPAR) 5 MG Tab Take 5 mg by mouth 3 (three) times daily.    losartan (COZAAR) 100 MG tablet Take 100 mg by mouth once daily.    metoprolol succinate (TOPROL-XL) 100 MG 24 hr tablet Take 100 mg by mouth once daily.    thyroid, pork, (NP THYROID) 15 mg Tab Take 1 mg by mouth before breakfast.    vitamin D (VITAMIN D3) 1000 units Tab Take 5,000 Units by mouth every other day.    sotaloL (BETAPACE) 80 MG tablet Take 80 mg by mouth 2 (two) times daily.     Family History       Problem Relation (Age of Onset)    Asthma Mother    Cancer Father    Diabetes Father    Hypertension Mother          Tobacco Use    Smoking status: Never    Smokeless tobacco: Never   Substance and Sexual Activity    Alcohol use: Not Currently    Drug use: Never    Sexual activity: Not on file     Review of Systems   All other systems reviewed and are negative.    Objective:     Vital Signs (Most Recent):  Temp: 98 °F (36.7 °C) (07/11/25 0402)  Pulse: 60 (07/11/25 0601)  Resp: 15 (07/11/25 0602)  BP: (!) 153/67 (07/11/25 0602)  SpO2: 95 % (07/11/25 0602) Vital Signs (24h Range):  Temp:  [98 °F (36.7 °C)-98.4 °F (36.9 °C)] 98 °F (36.7 °C)  Pulse:  [56-82] 60  Resp:  [15-20] 15  SpO2:  [95 %-98 %] 95 %  BP: (153-187)/(67-99) 153/67     Weight: 81.2 kg (179 lb)  Body mass index is 31.71 kg/m².     Physical Exam  Constitutional:       Appearance: She is obese.   HENT:      Head: Normocephalic and atraumatic.      Nose: Nose normal.       Mouth/Throat:      Mouth: Mucous membranes are dry.   Eyes:      Extraocular Movements: Extraocular movements intact.      Pupils: Pupils are equal, round, and reactive to light.   Pulmonary:      Effort: Pulmonary effort is normal.   Musculoskeletal:         General: Normal range of motion.      Cervical back: Normal range of motion and neck supple.   Skin:     General: Skin is warm and dry.      Capillary Refill: Capillary refill takes less than 2 seconds.   Neurological:      General: No focal deficit present.      Mental Status: She is alert and oriented to person, place, and time.   Psychiatric:         Mood and Affect: Mood normal.          NEUROLOGICAL EXAMINATION:     MENTAL STATUS   Oriented to person, place, and time.     CRANIAL NERVES     CN III, IV, VI   Pupils are equal, round, and reactive to light.      Significant Labs: All pertinent lab results from the past 24 hours have been reviewed.    Significant Imaging: I have reviewed all pertinent imaging results/findings within the past 24 hours.  Assessment and Plan:     Stroke  - presented with HA, dizziness, lightheadedness  - Stroke RF: HTN, Afib  - Intervention: OOW  - Etiology: concerning for embolic vs post procedural    Stroke workup:  -CTh: no acute abnormality   -CTA h/n: Very minimal calcification in the cavernous carotids bilaterally without flow limiting stenosis.    -MRI brain: . A few small foci of restricted diffusion which are dark on ADC map and bright on FLAIR, consistent with acute infarcts are seen in the subcortical right occipital lobe as well as in the right cerebellar hemisphere   -ECHO:  pending  -CUS: The right internal carotid artery is patent with less than 50% stenosis.  The left internal carotid artery is patent with less than 50% stenosis.  Tortuosity noted to cause increase in velocities without visualization of stenotic lesion.  Bilateral vertebral arteries are patent with antegrade flow.   -LDL: 97.00   -A1c: 5.9    -TSH: 0.994   -home medications include:  NIH Stroke Scale      1a  Level of consciousness: 0=alert; keenly responsive   1b. LOC questions:  0=Answers both tasks correctly   1c. LOC commands: 0=Answers both tasks correctly   2.  Best Gaze: 0=normal   3.  Visual: 0=No visual loss   4. Facial Palsy: 0=Normal symmetric movement   5a.  Motor left arm: 0=No drift, limb holds 90 (or 45) degrees for full 10 seconds   5b.  Motor right arm: 0=No drift, limb holds 90 (or 45) degrees for full 10 seconds   6a. motor left le=No drift, limb holds 90 (or 45) degrees for full 10 seconds   6b  Motor right le=No drift, limb holds 90 (or 45) degrees for full 10 seconds   7. Limb Ataxia: 0=Absent   8.  Sensory: 0=Normal; no sensory loss   9. Best Language:  0=No aphasia, normal   10. Dysarthria: 0=Normal   11. Extinction and Inattention: 0=No abnormality   12. Distal motor function: 0=Normal    Total:   0         Plan:    Recommend to continue Eliquis from a stroke stand point. (If cardiology is wanting to stop anticoagulation following cardioversion, which I do not see in any notes), we would recommend DAPT x 21 days followed by aspirin monotherapy. No need for further cardiac imaging if continuing home Eliquis.     - Medications for secondary stroke prevention.   -continue Anticoagulants: Apixaban 5 mg BID  . Ok to start now  -continue Atorvastatin 40mg daily  - PT/OT/speech therapy evaluation and recommendations  - Based on AHA/ACC 2021 guidelines, patient primary care doctor should target BP goal < 130 mm/hg, LDL-C < 70 mg/dl, and HBA1c of < 7% to minimize the risk of future strokes.  - Lovenox SQ for DVT prophylaxis   - OOW for permissive hypertension ok to normalize blood pressure  - Neuro checks q4hr ... stat CTh if any neuro change   - Continuous telemetry monitoring  - No bedrest, ok for patient to ambulate, RN to observe first ambulation for safety  - Patient will need follow up in stroke clinic with Treva  KAELY Watt in 3 months.   - Written patient-specific education provided to patient.   - Thank you for allowing us to participate in this patient's care, we will sign-off.          Patient/Family Stroke Education:  -I have discussed the patient's stroke risk factors and the importance to modify them in order to reduce the risk of future events.  Also, the importance of a healthy diet and daily exercise in order to reduce the risk of future strokes.    --Encourage medication compliance  --Life Style Factors: healthy diet and physical activity, encourage low salt, and Mediterranean Diet  --Smoking Cessation  --Avoid Excessive Alcohol consumption  --Evaluate/encourage compliance related to RADHAMES  --**To include other RF such as: CAD, SID, AF, and hypercoagulable states as applicable.  -I went over the usual stroke warning signs and symptoms, and the need to activate EMS (call 911) as soon as the symptoms present.  Sudden onset numbness or weakness of the face, arm, or leg; especially on one side of the body  Sudden confusion, trouble speaking, or understanding  Sudden trouble seeing in one or both eyes  Sudden trouble walking, dizziness, loss of coordination  Sudden severe headache with no known cause  -Discussed prescribed medication regime with the patient, including the indication for the medications as well as their potential side effects, and what to do in case they appear.               VTE Risk Mitigation (From admission, onward)           Ordered     Reason for No Pharmacological VTE Prophylaxis  Once        Question:  Reasons:  Answer:  Already adequately anticoagulated on oral Anticoagulants    07/10/25 2212     IP VTE HIGH RISK PATIENT  Once         07/10/25 2212     Place sequential compression device  Until discontinued         07/10/25 2212                    Thank you for your consult. I will sign off. Please contact us if you have any additional questions.    Yumiko Bagley,  AGACNP-BC  Neurology  [facility] - Emergency Dept

## 2025-07-11 NOTE — ED PROVIDER NOTES
Encounter Date: 7/10/2025    SCRIBE #1 NOTE: I, Liu Pope, am scribing for, and in the presence of,  Gume Landis IV, MD. I have scribed the entire note.       History     Chief Complaint   Patient presents with    Dizziness     Pt arrives POV via wheelchair with C/O dizziness and lightheadedness since Monday. Cardioversion on Monday. Denies falls/ trauma/ LOC. Denies SOB or CP in triage. Pt alert. GCS 15.      79 year old female with a hx of anxiety presents to the ED for dizziness. Pt states she had a cardioversion done on Monday. Pt states since this procedure she has experienced episodes of dizziness, lightheadedness, and headache. Dizziness worse when standing.  Pt denies any fall or trauma. Pt denies any chest pain or SOB.         Review of patient's allergies indicates:   Allergen Reactions    Potassium chloride Other (See Comments)     Passes out     Past Medical History:   Diagnosis Date    Anxiety disorder, unspecified     Essential (primary) hypertension     Hypothyroidism, unspecified     Osteopenia     Vitamin D deficiency      Past Surgical History:   Procedure Laterality Date    APPENDECTOMY      CHOLECYSTECTOMY      HYSTERECTOMY      TONSILLECTOMY      TONSILLECTOMY       Family History   Problem Relation Name Age of Onset    Hypertension Mother Kaylee perez     Asthma Mother Kaylee perez     Cancer Father Sammie sahu     Diabetes Father Sammie sahu      Social History[1]  Review of Systems   Constitutional:  Negative for chills and fever.   HENT:  Negative for congestion, rhinorrhea and sore throat.    Eyes:  Negative for visual disturbance.   Respiratory:  Negative for cough and shortness of breath.    Cardiovascular:  Negative for chest pain and leg swelling.   Gastrointestinal:  Negative for abdominal pain, nausea and vomiting.   Genitourinary:  Negative for dysuria, hematuria, vaginal bleeding and vaginal discharge.   Musculoskeletal:  Negative for joint swelling.   Skin:  Negative  for rash.   Neurological:  Positive for dizziness, light-headedness and headaches. Negative for weakness.   Psychiatric/Behavioral:  Negative for confusion.        Physical Exam     Initial Vitals [07/10/25 1642]   BP Pulse Resp Temp SpO2   (!) 187/70 82 20 98.4 °F (36.9 °C) 97 %      MAP       --         Physical Exam    Nursing note and vitals reviewed.  Constitutional: She is not diaphoretic. No distress.   HENT:   Head: Normocephalic and atraumatic.   Neck: Neck supple.   Normal range of motion.  Cardiovascular:  Normal rate and regular rhythm.           No murmur heard.  Pulmonary/Chest: Breath sounds normal. No respiratory distress.   Abdominal: Abdomen is soft. She exhibits no distension. There is no abdominal tenderness.   Musculoskeletal:      Cervical back: Normal range of motion and neck supple.     Neurological: She is alert and oriented to person, place, and time. She has normal strength. No cranial nerve deficit or sensory deficit.   Skin: Skin is warm. Capillary refill takes less than 2 seconds.   Psychiatric: She has a normal mood and affect.         ED Course   Procedures  Labs Reviewed   COMPREHENSIVE METABOLIC PANEL - Abnormal       Result Value    Sodium 142      Potassium 4.0      Chloride 107      CO2 24      Glucose 88      Blood Urea Nitrogen 11.6      Creatinine 0.67      Calcium 9.6      Protein Total 7.7 (*)     Albumin 4.1      Globulin 3.6 (*)     Albumin/Globulin Ratio 1.1      Bilirubin Total 0.8      ALP 96      ALT 20      AST 22      eGFR >60      Anion Gap 11.0      BUN/Creatinine Ratio 17     URINALYSIS, REFLEX TO URINE CULTURE - Abnormal    Color, UA Colorless      Appearance, UA Clear      Specific Gravity, UA 1.003 (*)     pH, UA 7.0      Protein, UA Negative      Glucose, UA Normal      Ketones, UA Negative      Blood, UA Negative      Bilirubin, UA Negative      Urobilinogen, UA Normal      Nitrites, UA Negative      Leukocyte Esterase, UA 25 (*)     RBC, UA 0-5      WBC, UA  0-5      Bacteria, UA None Seen      Squamous Epithelial Cells, UA Trace     CBC WITH DIFFERENTIAL - Abnormal    WBC 7.00      RBC 5.75 (*)     Hgb 15.2      Hct 47.1 (*)     MCV 81.9      MCH 26.4 (*)     MCHC 32.3 (*)     RDW 14.8      Platelet 287      MPV 8.9      Neut % 61.8      Lymph % 25.0      Mono % 9.6      Eos % 2.6      Basophil % 0.7      Imm Grans % 0.3      Neut # 4.33      Lymph # 1.75      Mono # 0.67      Eos # 0.18      Baso # 0.05      Imm Gran # 0.02      NRBC% 0.0     APTT - Normal    PTT 27.3     PROTIME-INR - Normal    PT 12.6      INR 0.9      Narrative:     Protimes are used to monitor anticoagulant agents such as warfarin. PT INR values are based on the current patient normal mean and the EUGENIO value for the specific instrument reagent used.  **Routine theraputic target values for the INR are 2.0-3.0**   TROPONIN I - Normal    Troponin-I <0.010     TSH - Normal    TSH 0.994     CBC W/ AUTO DIFFERENTIAL    Narrative:     The following orders were created for panel order CBC auto differential.  Procedure                               Abnormality         Status                     ---------                               -----------         ------                     CBC with Differential[[id number]]       Abnormal            Final result                 Please view results for these tests on the individual orders.   LIPID PANEL    Cholesterol Total 166      HDL Cholesterol 56      Triglyceride 65      Cholesterol/HDL Ratio 3      Very Low Density Lipoprotein 13      LDL Cholesterol 97.00     HEMOGLOBIN A1C    Hemoglobin A1c 5.9      Estimated Average Glucose 122.6     CBC W/ AUTO DIFFERENTIAL    Narrative:     The following orders were created for panel order CBC Auto Differential.  Procedure                               Abnormality         Status                     ---------                               -----------         ------                     CBC with Differential[[id number]]                                                         Please view results for these tests on the individual orders.   COMPREHENSIVE METABOLIC PANEL   MAGNESIUM   PHOSPHORUS   CBC WITH DIFFERENTIAL     EKG Readings: (Independently Interpreted)   Initial Reading: No STEMI. Rhythm: Normal Sinus Rhythm. Heart Rate: 70. Ectopy: No Ectopy. Conduction: Normal. ST Segments: Normal ST Segments. T Waves: Normal. Axis: Normal.   Done on 7/10/25 at 1604.        Imaging Results              MRI Brain Without Contrast (Preliminary result)  Result time 07/11/25 00:44:47      Preliminary result by Chuck Witt MD (07/11/25 00:44:47)                   Narrative:    START OF REPORT:  Technique: Multiplanar, multisequence magnetic resonance imaging of the brain was performed without intravenous contrast.    Comparison: None.    Clinical history: (Pt arrives POV via wheelchair with C/O dizziness and lightheadedness since Monday.    Findings:  Hemorrhage: No acute intracranial hemorrhage is identified.  Stroke: A few small foci of restricted diffusion which are dark on ADC map and bright on FLAIR, consistent with acute infarcts are seen in the subcortical right occipital lobe as well as in the right cerebellar hemisphere (images 18 through 22 series 3). No significant brain edema, midline shift or hydrocephalus is noted.  Extra axial spaces: The ventricles and sulci and basal cisterns all remain mildly consistent with global cerebral atrophy.  Cerebral, cerebellar, and brainstem parenchyma: Minimal periventricular and subcortical white matter signal abnormalities are seen. The main consideration is small vessel ischemic changes.  Visualized paranasal sinuses: Right maxillary sinusitis is noted.  Visualized orbits: The visualized orbits appear unremarkable.  Sella and skull base: Normal.    Notification: The results were discussed prior to dictation with the supervisor (Samir) at 2025-07-11 00:43:35 CDT.      Impression:  1. A few small  foci of restricted diffusion which are dark on ADC map and bright on FLAIR, consistent with acute infarcts are seen in the subcortical right occipital lobe as well as in the right cerebellar hemisphere (images 18 through 22 series 3). No significant brain edema, midline shift or hydrocephalus is noted. Correlate with clinical and laboratory parameters as regards further evaluation and follow up.  2. Details and other findings as discussed above.                                         CTA Head and Neck (xpd) (Preliminary result)  Result time 07/10/25 22:44:54      Preliminary result by Chuck Witt MD (07/10/25 22:44:54)                   Narrative:    START OF REPORT:  Technique: CT angiogram of the intracranial vessels was performed with intravenous contrast with direct axial as well as sagittal and coronal reformations. CT angiogram of the neck vessels was performed with intravenous contrast with direct axial as well as sagittal and coronal reformations.    Comparison: None.    Clinical history: Stroke workup, dizziness.    Findings:  Intracranial Vascular structures:  Internal carotid arteries: Subtle atheromatous calcification of the clinoid and supraclinoid segments of the bilateral internal carotid arteries is seen with no significant associated stenosis.  Middle cerebral arteries: Unremarkable.  Anterior cerebral arteries: Unremarkable.  Vertebral arteries: The left vertebral artery is dominant. The vertebral arteries otherwise appear unremarkable.  Basilar artery: Unremarkable.  Posterior cerebral arteries: Unremarkable.  Posterior communicating arteries: Unremarkable.  Jugular Veins and venous sinuses: Unremarkable.  Neck Vascular structures: The visualized aorta and origin of the great vessels of the neck appear unremarkable.  Carotids:  Common carotid arteries: Unremarkable.  Internal carotid artery: Unremarkable.  Vertebral arteries: Left vertebral artery is dominant. The vertebral arteries  otherwise appear unremarkable.  Jugular Veins and venous sinuses: Unremarkable.  Brain parenchyma: No abnormal intracranial enhancement is seen on the post contrast images.    Notifications: This is a stroke protocol report and the results were discussed with the emergency room physician (Dr Landis) prior to dictation at 2025-07-10 22:44:42 CDT.      Impression:  1. No abnormal intracranial enhancement is seen on the post contrast images.  2. Unremarkable CT angiogram of the head and neck. Details and other findings as described above.                                         CT Head Without Contrast (Final result)  Result time 07/10/25 17:22:55      Final result by Lisa Jones MD (07/10/25 17:22:55)                   Impression:      No acute intracranial abnormality seen      Electronically signed by: Zeus Jones  Date:    07/10/2025  Time:    17:22               Narrative:    EXAMINATION:  CT HEAD WITHOUT CONTRAST    CLINICAL HISTORY:  Dizziness, persistent/recurrent, cardiac or vascular cause suspected;    TECHNIQUE:  Multiple axial images were obtained from the base of the brain to the vertex without contrast administration.  Sagittal and coronal reconstructions were performed. .Automatic exposure control  (AEC) is utilized to reduce patient radiation exposure.    COMPARISON:  None    FINDINGS:  There is no intracranial mass or lesion seen.  No hemorrhage is seen.  No infarct is seen.  The ventricles and basilar cisterns appear normal.  Brain parenchyma appears grossly unremarkable.    Posterior fossa appears normal.  The calvarium is intact.  There is a mucous retention cyst in the right and left maxillary sinus                                       X-Ray Chest 1 View (Final result)  Result time 07/10/25 17:17:53      Final result by Gamaliel White MD (07/10/25 17:17:53)                   Impression:      No acute cardiopulmonary process identified.      Electronically signed by: Gamaliel  White  Date:    07/10/2025  Time:    17:17               Narrative:    EXAMINATION:  XR CHEST 1 VIEW    CLINICAL HISTORY:  Dizziness and giddiness    TECHNIQUE:  One view    COMPARISON:  April 28, 2025.    FINDINGS:  Cardiopericardial silhouette is within normal limits.  No acute dense focal or segmental consolidation, congestive process, pleural effusions or pneumothorax.  There is some lumbar dextroscoliotic curvature.                                       Medications   sodium chloride 0.9% flush 10 mL (has no administration in time range)   labetaloL injection 10 mg (has no administration in time range)   bisacodyL suppository 10 mg (has no administration in time range)   atorvastatin tablet 40 mg (has no administration in time range)   aspirin EC tablet 81 mg (has no administration in time range)   sodium chloride 0.9% bolus 1,000 mL 1,000 mL (0 mLs Intravenous Stopped 7/10/25 1957)   iohexoL (OMNIPAQUE 350) injection 84 mL (84 mLs Intravenous Given 7/10/25 2214)     Medical Decision Making  80 yo presenting with vertigo, gait instability  Exam as above  Neuro intact at this time  With her risk factors, will admit for posterior CVA workup     Differential diagnosis (including but not limited to):   Judging by the patient's chief complaint and pertinent history, the patient has the following possible differential diagnoses, including but not limited to the following.  Some of these are deemed to be lower likelihood and some more likely based on my physical exam and history combined with possible lab work and/or imaging studies.   Please see the pertinent studies, and refer to the HPI.  Some of these diagnoses will take further evaluation to fully rule out, perhaps as an outpatient and the patient was encouraged to follow up when discharged for more comprehensive evaluation.    Peripheral vertigo: BPV, labyrinthitis (assoc with hearing loss), vestibular Neuronitis (recent viral infection), otitis Media,  meniere's, trauma, FB.   Central vertigo:  vertebral basilar artery insufficiency, cerebellar hemorrhage, meningitis, multiple Sclerosis, trauma, temporal lobe epilepsy, post-concussive syndrome, tumor.       Problems Addressed:  Dizziness: acute illness or injury that poses a threat to life or bodily functions  Vertigo: acute illness or injury that poses a threat to life or bodily functions    Amount and/or Complexity of Data Reviewed  Labs: ordered.  Radiology: ordered and independent interpretation performed.     Details: Head CT - no obvious bleed or mass     ECG/medicine tests: ordered and independent interpretation performed.     Details: See above   Discussion of management or test interpretation with external provider(s): Discussed with hospitalist - will admit     Risk  Decision regarding hospitalization.            Scribe Attestation:   Scribe #1: I performed the above scribed service and the documentation accurately describes the services I performed. I attest to the accuracy of the note.    Attending Attestation:           Physician Attestation for Scribe:  Physician Attestation Statement for Scribe #1: I, Gume Landis IV, MD, reviewed documentation, as scribed by Liu Pope in my presence, and it is both accurate and complete.                                    Clinical Impression:  Final diagnoses:  [R42] Dizziness  [R42] Vertigo (Primary)          ED Disposition Condition    Observation                       [1]   Social History  Tobacco Use    Smoking status: Never    Smokeless tobacco: Never   Substance Use Topics    Alcohol use: Not Currently    Drug use: Never        Gume Landis IV, MD  07/11/25 0119

## 2025-07-11 NOTE — HPI
Patient is an 79-year-old female with a PMHx of anxiety, AFib, HTN, hypothyroidism, and osteopenia presented to the ER with complaints of dizziness. Per chart, patient reported cardioversion on Monday and has had episodes of dizziness, lightheadedness, and HA since procedure. No acute abnormality on CT head, CTA without LVO or flow limiting stenosis. MRI brain preliminary read revealing for right occipital and right cerebellar infarcts. Labs unremarkable. Hypertensive on admit with SBP in the 180's. Patient was admitted to Hospitalists and Stroke Neurology consulted for further evaluation. Patient home medication list's includes Eliquis.    .

## 2025-07-11 NOTE — PT/OT/SLP EVAL
Occupational Therapy   Evaluation and Discharge Note    Name: Jen Fitzgerald  MRN: [id number]  Admitting Diagnosis: dizziness/lightheadedness  Recent Surgery: * No surgery found *      Recommendations:     Discharge therapy intensity: No Therapy Indicated   Discharge Equipment Recommendations: none  Barriers to discharge:       Assessment:     Jen Fitzgerald is a 79 y.o. female with a medical diagnosis of dizziness since Monday after getting cardioversion for a-fib. On eval, patient presents with no deficits in ADLs/functional mobility. Skilled OT services are not warranted at this time.    DME Justification: No DME recommended requiring DME justifications    Plan:     OT to sign off as acute OT services are not warranted at this time.  Please re-consult if situation changes during this hospitalization.    Plan of Care Reviewed with: patient    Subjective     Chief Complaint: head heaviness   Patient/Family Comments/goals: to go home     Occupational Profile:  Living Environment: pt lives alone, 1 story, tub shower   Previous level of function: indep   Roles and Routines: drives, mother  Equipment Used at Home: shower chair, rollator, cane, straight  Assistance upon Discharge: family to stay with pt at home once d/c     Pain/Comfort:  Pain Rating 1: 1/10  Location 1: head  Pain Addressed 1: Reposition    Patients cultural, spiritual, Christian conflicts given the current situation:      Objective:     OT communicated with nrsg prior to session.      Patient was found HOB elevated with   upon OT entry to room.    General Precautions: Standard,    Orthopedic Precautions:    Braces:      Vital Signs: Blood Pressure: 155/79  HR: 70  Sp02: 96 on RA    Functional Mobility/Transfers:  Bed mobility:    Supine to Sit: independence  Sit to Supine: independence  Transfers: Sit to Stand: independence with no AD    Activities of Daily Living:  Upper Body Dressing: independence    Lower Body Dressing: independence    Toileting:  independence      AMPA 6 Click ADL:  AMPAC Total Score: 24    Functional Cognition:  Intact    Visual Perceptual Skills:  Intact    Upper Extremity Function:  Right Upper Extremity:   WFL    Left Upper Extremity:  WFL    Balance:   Intact    Therapeutic Positioning  Risk for acquired pressure injuries is decreased due to ability to mobilize independently .    All visible skin intact; pt fully dressed     Co-Treatment: No    Patient Education:  Patient and son/s were provided with verbal education education regarding OT role/goals/POC, fall prevention, safety awareness, and Discharge/DME recommendations.  Understanding was verbalized.     Patient left HOB elevated with all lines intact, call button in reach, and son  present.    History:     Past Medical History:   Diagnosis Date    Anxiety disorder, unspecified     Essential (primary) hypertension     Hypothyroidism, unspecified     Osteopenia     Vitamin D deficiency          Past Surgical History:   Procedure Laterality Date    APPENDECTOMY      CHOLECYSTECTOMY      HYSTERECTOMY      TONSILLECTOMY      TONSILLECTOMY         Time Tracking:     OT Date of Treatment:    OT Start Time: 1058  OT Stop Time: 1111  OT Total Time (min): 13 min    Billable Minutes:Evaluation Low Complexity     7/11/2025

## 2025-07-11 NOTE — PT/OT/SLP EVAL
Physical Therapy Evaluation and Discharge Note    Patient Name:  Jen Fitzgerald   MRN:  [id number]    Recommendations:     Discharge therapy intensity: No Therapy Indicated   Discharge Equipment Recommendations:     Barriers to discharge: None    Assessment:     Jen Fitzgerald is a 79 y.o. female admitted with a medical diagnosis of dizziness  . .  At this time, patient is functioning at their prior level of function and does not require further acute PT services.     DME Justification:  No DME recommended requiring DME justifications    Recent Surgery: * No surgery found *      Plan:     During this hospitalization, patient does not require further acute PT services.  Please re-consult if situation changes.      Subjective     Chief Complaint:   Patient/Family Comments/goals:   Pain/Comfort:       Patients cultural, spiritual, Christian conflicts given the current situation:      Living Environment:  Pt lives alone  in  [facility] with no steps  Prior to admission, patients level of function was ind.  Equipment used at home:  .  DME owned (not currently used): .  Upon discharge, patient will have assistance from herself.    Objective:     Communicated with nurse prior to session.  Patient found supine with blood pressure cuff, telemetry, pulse ox (continuous) upon PT entry to room.    General Precautions: Standard,      Orthopedic Precautions:N/A   Braces: N/A  Respiratory Status: Room air  Blood Pressure:     Exams:  RLE ROM: WFL  RLE Strength: WFL  LLE ROM: WFL  LLE Strength: WFL    Functional Mobility:  Bed Mobility:     Supine to Sit: independence  Sit to Supine: independence  Transfers:     Sit to Stand:  independence with no AD  Bed to Chair: independence with  no AD  using  Step Transfer  Gait: amb 100ft with no ad ind    AM-PAC 6 CLICK MOBILITY  Total Score:      Co-Treatment: No    Treatment and Education:      Patient provided with verbal education education regarding PT role/goals/POC.  Understanding was  verbalized.     Patient left supine with all lines intact and call button in reach.    GOALS:   Multidisciplinary Problems       Physical Therapy Goals       Not on file                    History:     Past Medical History:   Diagnosis Date    Anxiety disorder, unspecified     Essential (primary) hypertension     Hypothyroidism, unspecified     Osteopenia     Vitamin D deficiency        Past Surgical History:   Procedure Laterality Date    APPENDECTOMY      CHOLECYSTECTOMY      HYSTERECTOMY      TONSILLECTOMY      TONSILLECTOMY         Time Tracking:     PT Received On:    PT Start Time: 1250     PT Stop Time: 1310  PT Total Time (min): 20 min     Billable Minutes: Evaluation 20 07/11/2025

## 2025-07-12 VITALS
RESPIRATION RATE: 17 BRPM | TEMPERATURE: 98 F | HEART RATE: 59 BPM | DIASTOLIC BLOOD PRESSURE: 91 MMHG | WEIGHT: 179 LBS | HEIGHT: 63 IN | OXYGEN SATURATION: 96 % | BODY MASS INDEX: 31.71 KG/M2 | SYSTOLIC BLOOD PRESSURE: 163 MMHG

## 2025-07-12 PROCEDURE — 25000003 PHARM REV CODE 250

## 2025-07-12 PROCEDURE — 25000003 PHARM REV CODE 250: Performed by: INTERNAL MEDICINE

## 2025-07-12 RX ORDER — ATORVASTATIN CALCIUM 40 MG/1
40 TABLET, FILM COATED ORAL DAILY
Qty: 90 TABLET | Refills: 1 | Status: SHIPPED | OUTPATIENT
Start: 2025-07-13

## 2025-07-12 RX ORDER — LOSARTAN POTASSIUM 50 MG/1
50 TABLET ORAL ONCE
Status: DISCONTINUED | OUTPATIENT
Start: 2025-07-12 | End: 2025-07-12

## 2025-07-12 RX ORDER — SOTALOL HYDROCHLORIDE 80 MG/1
80 TABLET ORAL 2 TIMES DAILY
Start: 2025-07-12

## 2025-07-12 RX ORDER — LOSARTAN POTASSIUM 50 MG/1
100 TABLET ORAL DAILY
Status: DISCONTINUED | OUTPATIENT
Start: 2025-07-13 | End: 2025-07-12 | Stop reason: HOSPADM

## 2025-07-12 RX ADMIN — BUSPIRONE HYDROCHLORIDE 5 MG: 5 TABLET ORAL at 09:07

## 2025-07-12 RX ADMIN — METOPROLOL SUCCINATE 100 MG: 50 TABLET, EXTENDED RELEASE ORAL at 09:07

## 2025-07-12 RX ADMIN — LOSARTAN POTASSIUM 50 MG: 50 TABLET, FILM COATED ORAL at 09:07

## 2025-07-12 RX ADMIN — AMLODIPINE BESYLATE 5 MG: 5 TABLET ORAL at 09:07

## 2025-07-12 RX ADMIN — ATORVASTATIN CALCIUM 40 MG: 40 TABLET, FILM COATED ORAL at 09:07

## 2025-07-12 RX ADMIN — LEVOTHYROXINE, LIOTHYRONINE 60 MG: 19; 4.5 TABLET ORAL at 06:07

## 2025-07-12 RX ADMIN — APIXABAN 5 MG: 5 TABLET, FILM COATED ORAL at 09:07

## 2025-07-12 NOTE — CONSULTS
Inpatient consult to Cardiology  Consult performed by: Dee Gloria MD  Consult ordered by: Dee Gloria MD      [facility]    Cardiology  Consult Note    Patient Name: Jen Fitzgerald  MRN: [id number]  Admission Date: 7/10/2025  Hospital Length of Stay: 1 days  Code Status: DNR   Attending Provider: Dee Gloria MD   Consulting Provider: Mingo Ralph RN  Primary Care Physician: Blayne Bautista MD  Principal Problem:<principal problem not specified>    Patient information was obtained from patient, past medical records, and ER records.     Subjective:     Chief Complaint/Reason for Consult: Post cardioversion CVA, Recommendations for anticoagulation     HPI: Ms. Fitzgerald is a 79 year old female, known to Dr. Blankenship/Dr. Johnston, w/PMH of persistent A FIB, PAD, Hypothyroidism, HTN, who presented to ED  with reports of dizziness, lightheadedness, headache since having a cardioversion performed on Monday.   ED workup revealed CT head No acute intracranial abnormality seen ,CTA head Very minimal calcification in the cavernous carotids bilaterally without flow limiting stenosis, MR brain There are few small acute ischemic infarcts noted in the right occipital lobe and superior right cerebellar hemisphere.[facility] is consulted for anticoagulation recommendations.           PMH: Persistent A FIB, PAD, Hypothyroidism, HTN, Anxiety, Osteopenia, Vitamin  D def  PSH: Tonsillectomy, Cholecystectomy, Appendectomy, Caesarean section, Hysterectomy   Family History: Father- HTN, Mother- HTN  Social History: Never smoker     Previous Cardiac Diagnostics:   TTE 7.10.25      Left Ventricle: The left ventricle is normal in size. Normal wall thickness. There is concentric remodeling. There is low normal systolic function with a visually estimated ejection fraction of 50 - 55%.    Right Ventricle: The right ventricle is normal in size measuring 3.2 cm. Systolic function is normal.    Left Atrium: The  left atrium is mildly dilated Agitated saline study of the atrial septum is negative after vasalva maneuver, suggesting absence of intracardiac shunt at the atrial level.    Right Atrium: The right atrium is dilated.    Aortic Valve: There is no stenosis. Aortic valve peak velocity is 1.1 m/s. Mean gradient is 3 mmHg.    Mitral Valve: There is mild regurgitation.    Tricuspid Valve: There is mild regurgitation.  Carotid US 7.10.25    The right internal carotid artery is patent with less than 50% stenosis.  The left internal carotid artery is patent with less than 50% stenosis.  Tortuosity noted to cause increase in velocities without visualization of stenotic lesion.  Bilateral vertebral arteries are patent with antegrade flow.    Cardioversion 7.1.25  The pre-cardioversion rhythm was atrial fibrillation. A 200 J synchronized cardioversion was successfully performed with restoration of normal sinus rhythm. The post-cardioversion rhythm was normal sinus rhythm. Post cardioversion heart rate was 61 BPM.     TTE 4.28.25      Left Ventricle: The left ventricle is normal in size. Normal wall thickness. There is mildly reduced systolic function with a visually estimated ejection fraction of 45 - 50%. Unable to assess diastolic function due to atrial fibrillation.    Right Ventricle: The right ventricle is normal in size. Systolic function is reduced.    Left Atrium: Mildly dilated    Right Atrium: Right atrium is mildly dilated.    Aortic Valve: There is no stenosis. There is mild aortic regurgitation.    Mitral Valve: There is mild to moderate regurgitation.    Tricuspid Valve: There is mild regurgitation.    IVC/SVC: Normal venous pressure at 3 mmHg.    Pericardium: There is no pericardial effusion.       Review of patient's allergies indicates:   Allergen Reactions    Potassium chloride Other (See Comments)     Passes out     No current facility-administered medications on file prior to encounter.     Current  Outpatient Medications on File Prior to Encounter   Medication Sig    amLODIPine (NORVASC) 5 MG tablet TAKE ONE TABLET BY MOUTH EACH MORNING    apixaban (ELIQUIS) 5 mg Tab Take 1 tablet (5 mg total) by mouth 2 (two) times daily.    ARMOUR THYROID 60 mg Tab TAKE ONE TABLET BY MOUTH EACH MORNING    busPIRone (BUSPAR) 15 MG tablet Take 15 mg by mouth Daily.    busPIRone (BUSPAR) 5 MG Tab Take 5 mg by mouth 3 (three) times daily.    losartan (COZAAR) 100 MG tablet Take 100 mg by mouth once daily.    metoprolol succinate (TOPROL-XL) 100 MG 24 hr tablet Take 100 mg by mouth once daily.    thyroid, pork, (NP THYROID) 15 mg Tab Take 1 mg by mouth before breakfast.    vitamin D (VITAMIN D3) 1000 units Tab Take 5,000 Units by mouth every other day.    sotaloL (BETAPACE) 80 MG tablet Take 80 mg by mouth 2 (two) times daily.     Family History       Problem Relation (Age of Onset)    Asthma Mother    Cancer Father    Diabetes Father    Hypertension Mother          Tobacco Use    Smoking status: Never    Smokeless tobacco: Never   Substance and Sexual Activity    Alcohol use: Not Currently    Drug use: Never    Sexual activity: Not on file       Review of Systems   Cardiovascular:  Negative for chest pain, palpitations and leg swelling.     Objective:     Vital Signs (Most Recent):  Temp: 97.6 °F (36.4 °C) (07/12/25 0802)  Pulse: 73 (07/12/25 0802)  Resp: 17 (07/11/25 2300)  BP: (!) 177/93 (07/12/25 0802)  SpO2: 96 % (07/12/25 0802) Vital Signs (24h Range):  Temp:  [97.6 °F (36.4 °C)-98.4 °F (36.9 °C)] 97.6 °F (36.4 °C)  Pulse:  [61-73] 73  Resp:  [17-22] 17  SpO2:  [92 %-99 %] 96 %  BP: (132-177)/(77-93) 177/93   Weight: 81.2 kg (179 lb)  Body mass index is 31.71 kg/m².  SpO2: 96 %     No intake or output data in the 24 hours ending 07/12/25 0814  Lines/Drains/Airways       Peripheral Intravenous Line  Duration             Peripheral IV 07/10/25 1817 20 G Left;Distal Forearm 1 day                  Significant Labs:    Chemistries:   Recent Labs   Lab 07/10/25  1817 07/11/25  0356    140   K 4.0 3.8    108*   CO2 24 21*   BUN 11.6 7.7*   CREATININE 0.67 0.64   CALCIUM 9.6 9.1   PROT 7.7* 6.9   BILITOT 0.8 1.0   ALKPHOS 96 86   ALT 20 19   AST 22 21   MG  --  2.00   PHOS  --  3.0   TROPONINI <0.010  --         CBC/Anemia Labs: Coags:    Recent Labs   Lab 07/10/25  1817 07/11/25  0356   WBC 7.00 8.59   HGB 15.2 15.0   HCT 47.1* 47.4*    274   MCV 81.9 83.3   RDW 14.8 14.8    Recent Labs   Lab 07/10/25  1816   INR 0.9   APTT 27.3        Significant Imaging:  Imaging Results              MRI Brain Without Contrast (Final result)  Result time 07/11/25 06:36:04      Final result by Colin Ruiz MD (07/11/25 06:36:04)                   Impression:      There are few small acute ischemic infarcts noted in the right occipital lobe and superior right cerebellar hemisphere.    I agree with the preliminary report.      Electronically signed by: Colin Ruiz  Date:    07/11/2025  Time:    06:36               Narrative:    EXAMINATION:  MRI BRAIN WITHOUT CONTRAST    CLINICAL HISTORY:  Transient ischemic attack (TIA);    TECHNIQUE:  Multiplanar multisequence MR imaging of the brain was performed without contrast.    COMPARISON:  CT head without contrast and CT angiogram head and neck 07/10/2025.    FINDINGS:  INTRACRANIAL: There are a few small foci of diffusion restriction in FLAIR hyperintense signal noted within the right occipital lobe and superior right cerebellar hemisphere which are most consistent with acute ischemic infarcts.  Mild chronic small-vessel ischemic changes of the supratentorial periventricular and subcortical white matter.  Mild generalized brain atrophy.  No evidence of intracranial hemorrhage.  No extra-axial fluid collection or mass.  No intracranial mass effect.  No hydrocephalus.  Midline structures have a normal configuration.  Visualized pituitary gland and infundibulum are normal.  Visualized  major intracranial vascular structures demonstrate normal flow voids and are normal in course and caliber.    SINUSES: Mild nodular mucosal thickening versus small mucous retention cyst in the maxillary sinuses bilaterally, left sphenoid sinus, and right ethmoid sinus.  Trace bilateral mastoid fluid.    ORBITS: Visualized orbits are normal.                        Preliminary result by Chuck Witt MD (07/11/25 00:44:47)                   Impression:    1. A few small foci of restricted diffusion which are dark on ADC map and bright on FLAIR, consistent with acute infarcts are seen in the subcortical right occipital lobe as well as in the right cerebellar hemisphere (images 18 through 22 series 3). No significant brain edema, midline shift or hydrocephalus is noted. Correlate with clinical and laboratory parameters as regards further evaluation and follow up.  2. Details and other findings as discussed above.               Narrative:    START OF REPORT:  Technique: Multiplanar, multisequence magnetic resonance imaging of the brain was performed without intravenous contrast.    Comparison: None.    Clinical history: (Pt arrives POV via wheelchair with C/O dizziness and lightheadedness since Monday.    Findings:  Hemorrhage: No acute intracranial hemorrhage is identified.  Stroke: A few small foci of restricted diffusion which are dark on ADC map and bright on FLAIR, consistent with acute infarcts are seen in the subcortical right occipital lobe as well as in the right cerebellar hemisphere (images 18 through 22 series 3). No significant brain edema, midline shift or hydrocephalus is noted.  Extra axial spaces: The ventricles and sulci and basal cisterns all remain mildly consistent with global cerebral atrophy.  Cerebral, cerebellar, and brainstem parenchyma: Minimal periventricular and subcortical white matter signal abnormalities are seen. The main consideration is small vessel ischemic changes.  Visualized  paranasal sinuses: Right maxillary sinusitis is noted.  Visualized orbits: The visualized orbits appear unremarkable.  Sella and skull base: Normal.    Notification: The results were discussed prior to dictation with the supervisor (Samir) at 2025-07-11 00:43:35 CDT.                                         CTA Head and Neck (xpd) (Final result)  Result time 07/11/25 04:24:22      Final result by Leoncio Sewell MD (07/11/25 04:24:22)                   Impression:      Very minimal calcification in the cavernous carotids bilaterally without flow limiting stenosis.    [employer] concordance      Electronically signed by: Leoncio Sewell MD  Date:    07/11/2025  Time:    04:24               Narrative:    EXAMINATION:  CTA HEAD AND NECK (XPD)    CLINICAL HISTORY:  Stroke/TIA, determine embolic source;    TECHNIQUE:  Axial images of the head and neck were obtained with IV contrast administration.  Coronal and sagittal reconstructions were provided.  Three dimensional and MIP images were obtained and evaluated.  Total DLP was 1562 mGy-cm. Dose lowering technique and automated exposure control were utilized for this exam.The degree of carotid stenosis was evaluated using NASCET criteria.    COMPARISON:  CT of the head 07/10/2025.    FINDINGS:  Vascular findings:    There is a bovine configuration of the aortic arch.  The brachiocephalic artery is normal.  The right common carotid artery is normal.  The right cervical ICA is normal.  The left common carotid artery is normal.  The left cervical ICA is normal.  The dominant left and non dominant right vertebral arteries are normal throughout their cervical courses.  The intradural vertebral arteries are normal.  The basilar artery is normal.  The petrous and cavernous carotids are normal.  The [id number] is intact.  There is no aneurysm.  There is no focal stenosis.  There is no paucity of vasculature.  The dural venous sinuses are widely patent.    Nonvascular  findings:    There is no hemorrhage, hydrocephalus, or midline shift.  There is no abnormal intracranial enhancement.  The bilateral orbits are normal.  There is minimal mucoperiosteal thickening of the bilateral maxillary sinuses.  The nasopharynx and oropharynx are widely patent.  There is no cervical adenopathy.  The visualized lung apices are clear.                        Preliminary result by Chuck Witt MD (07/10/25 22:44:54)                   Impression:    1. No abnormal intracranial enhancement is seen on the post contrast images.  2. Unremarkable CT angiogram of the head and neck. Details and other findings as described above.               Narrative:    START OF REPORT:  Technique: CT angiogram of the intracranial vessels was performed with intravenous contrast with direct axial as well as sagittal and coronal reformations. CT angiogram of the neck vessels was performed with intravenous contrast with direct axial as well as sagittal and coronal reformations.    Comparison: None.    Clinical history: Stroke workup, dizziness.    Findings:  Intracranial Vascular structures:  Internal carotid arteries: Subtle atheromatous calcification of the clinoid and supraclinoid segments of the bilateral internal carotid arteries is seen with no significant associated stenosis.  Middle cerebral arteries: Unremarkable.  Anterior cerebral arteries: Unremarkable.  Vertebral arteries: The left vertebral artery is dominant. The vertebral arteries otherwise appear unremarkable.  Basilar artery: Unremarkable.  Posterior cerebral arteries: Unremarkable.  Posterior communicating arteries: Unremarkable.  Jugular Veins and venous sinuses: Unremarkable.  Neck Vascular structures: The visualized aorta and origin of the great vessels of the neck appear unremarkable.  Carotids:  Common carotid arteries: Unremarkable.  Internal carotid artery: Unremarkable.  Vertebral arteries: Left vertebral artery is dominant. The vertebral  arteries otherwise appear unremarkable.  Jugular Veins and venous sinuses: Unremarkable.  Brain parenchyma: No abnormal intracranial enhancement is seen on the post contrast images.    Notifications: This is a stroke protocol report and the results were discussed with the emergency room physician (Dr Landis) prior to dictation at 2025-07-10 22:44:42 CDT.                                         CT Head Without Contrast (Final result)  Result time 07/10/25 17:22:55      Final result by Lisa Jones MD (07/10/25 17:22:55)                   Impression:      No acute intracranial abnormality seen      Electronically signed by: Zeus Jones  Date:    07/10/2025  Time:    17:22               Narrative:    EXAMINATION:  CT HEAD WITHOUT CONTRAST    CLINICAL HISTORY:  Dizziness, persistent/recurrent, cardiac or vascular cause suspected;    TECHNIQUE:  Multiple axial images were obtained from the base of the brain to the vertex without contrast administration.  Sagittal and coronal reconstructions were performed. .Automatic exposure control  (AEC) is utilized to reduce patient radiation exposure.    COMPARISON:  None    FINDINGS:  There is no intracranial mass or lesion seen.  No hemorrhage is seen.  No infarct is seen.  The ventricles and basilar cisterns appear normal.  Brain parenchyma appears grossly unremarkable.    Posterior fossa appears normal.  The calvarium is intact.  There is a mucous retention cyst in the right and left maxillary sinus                                       X-Ray Chest 1 View (Final result)  Result time 07/10/25 17:17:53      Final result by Gamaliel White MD (07/10/25 17:17:53)                   Impression:      No acute cardiopulmonary process identified.      Electronically signed by: Gamaliel White  Date:    07/10/2025  Time:    17:17               Narrative:    EXAMINATION:  XR CHEST 1 VIEW    CLINICAL HISTORY:  Dizziness and giddiness    TECHNIQUE:  One view    COMPARISON:  April  28, 2025.    FINDINGS:  Cardiopericardial silhouette is within normal limits.  No acute dense focal or segmental consolidation, congestive process, pleural effusions or pneumothorax.  There is some lumbar dextroscoliotic curvature.                                    EKG:     Telemetry:      Physical Exam  Cardiovascular:      Rate and Rhythm: Normal rate and regular rhythm.   Abdominal:      General: Bowel sounds are normal.   Musculoskeletal:      Right lower leg: No edema.      Left lower leg: No edema.   Skin:     General: Skin is warm and dry.      Capillary Refill: Capillary refill takes less than 2 seconds.   Neurological:      Mental Status: She is alert. Mental status is at baseline.       Home Medications:   Medications Ordered Prior to Encounter[1]  Current Schedule Inpatient Medications:   amLODIPine  5 mg Oral Daily    apixaban  5 mg Oral BID    atorvastatin  40 mg Oral Daily    busPIRone  5 mg Oral TID    losartan  50 mg Oral Daily    metoprolol succinate  100 mg Oral Daily    thyroid (pork)  60 mg Oral Before breakfast     Continuous Infusions:    Assessment:   CVA  --Embolic vs Post procedural   Persistent Atrial Fibrillation  --S/P DCCV 7.1.25  --CHADsVASc 7  -- on eliquis outpatient   HTN  PAD  Hypothyroidism  DNR    Plan:   Patient should remain on Eliquis for four weeks post cardioversion.  Continue Apixaban 5 mg BID    Continue Atorvastatin 40mg daily  Increase losartan to 100 mg daily  Continue amlodipine   Neurology cleared patient to continue anticoagulation as well.  No further cardiac workup needed  Cardiology will be available as needed. Reconsult for any new cardiac issues.       Transcribed in the presence of Dr. Preston Ralph, RN  Cardiology  [facility]     Physician Addendum:    Patient's cardiac care is performed as a split-shared visit with MARCOS d/t complicated medical management as detailed in A/P and associated high acuity requiring physician  expertise. I obtained and performed relevant components of history/exam. Medical decision-making is formulated by me. It is a pleasure to care for the patient.    Impression/Plan:          Portillo Johnston MD  [facility] Cardiology Team          [1]   No current facility-administered medications on file prior to encounter.     Current Outpatient Medications on File Prior to Encounter   Medication Sig Dispense Refill    amLODIPine (NORVASC) 5 MG tablet TAKE ONE TABLET BY MOUTH EACH MORNING 30 tablet 11    apixaban (ELIQUIS) 5 mg Tab Take 1 tablet (5 mg total) by mouth 2 (two) times daily. 60 tablet 5    ARMOUR THYROID 60 mg Tab TAKE ONE TABLET BY MOUTH EACH MORNING 30 tablet 11    busPIRone (BUSPAR) 15 MG tablet Take 15 mg by mouth Daily.      busPIRone (BUSPAR) 5 MG Tab Take 5 mg by mouth 3 (three) times daily.      losartan (COZAAR) 100 MG tablet Take 100 mg by mouth once daily.      metoprolol succinate (TOPROL-XL) 100 MG 24 hr tablet Take 100 mg by mouth once daily.      thyroid, pork, (NP THYROID) 15 mg Tab Take 1 mg by mouth before breakfast.      vitamin D (VITAMIN D3) 1000 units Tab Take 5,000 Units by mouth every other day.      sotaloL (BETAPACE) 80 MG tablet Take 80 mg by mouth 2 (two) times daily.

## 2025-07-14 ENCOUNTER — HOSPITAL ENCOUNTER (EMERGENCY)
Facility: HOSPITAL | Age: 80
Discharge: HOME | End: 2025-07-14
Attending: EMERGENCY MEDICINE
Payer: MEDICARE

## 2025-07-14 ENCOUNTER — NURSE TRIAGE (OUTPATIENT)
Dept: ADMINISTRATIVE | Facility: CLINIC | Age: 80
End: 2025-07-14
Payer: MEDICARE

## 2025-07-14 VITALS
WEIGHT: 180 LBS | OXYGEN SATURATION: 98 % | SYSTOLIC BLOOD PRESSURE: 158 MMHG | HEIGHT: 63 IN | HEART RATE: 56 BPM | BODY MASS INDEX: 31.89 KG/M2 | DIASTOLIC BLOOD PRESSURE: 80 MMHG | TEMPERATURE: 98 F | RESPIRATION RATE: 17 BRPM

## 2025-07-14 DIAGNOSIS — R42 DIZZINESS: Primary | ICD-10-CM

## 2025-07-14 DIAGNOSIS — I10 PRIMARY HYPERTENSION: ICD-10-CM

## 2025-07-14 LAB
ALBUMIN SERPL-MCNC: 4 G/DL (ref 3.4–4.8)
ALBUMIN/GLOB SERPL: 1.1 RATIO (ref 1.1–2)
ALP SERPL-CCNC: 95 UNIT/L (ref 40–150)
ALT SERPL-CCNC: 19 UNIT/L (ref 0–55)
ANION GAP SERPL CALC-SCNC: 8 MEQ/L
AST SERPL-CCNC: 23 UNIT/L (ref 11–45)
BACTERIA #/AREA URNS AUTO: ABNORMAL /HPF
BASOPHILS # BLD AUTO: 0.05 X10(3)/MCL
BASOPHILS NFR BLD AUTO: 0.7 %
BILIRUB SERPL-MCNC: 1 MG/DL
BILIRUB UR QL STRIP.AUTO: NEGATIVE
BUN SERPL-MCNC: 12.1 MG/DL (ref 9.8–20.1)
CALCIUM SERPL-MCNC: 10.3 MG/DL (ref 8.4–10.2)
CHLORIDE SERPL-SCNC: 103 MMOL/L (ref 98–107)
CLARITY UR: CLEAR
CO2 SERPL-SCNC: 25 MMOL/L (ref 23–31)
COLOR UR AUTO: COLORLESS
CREAT SERPL-MCNC: 0.75 MG/DL (ref 0.55–1.02)
CREAT/UREA NIT SERPL: 16
EOSINOPHIL # BLD AUTO: 0.22 X10(3)/MCL (ref 0–0.9)
EOSINOPHIL NFR BLD AUTO: 2.9 %
ERYTHROCYTE [DISTWIDTH] IN BLOOD BY AUTOMATED COUNT: 15 % (ref 11.5–17)
GFR SERPLBLD CREATININE-BSD FMLA CKD-EPI: >60 ML/MIN/1.73/M2
GLOBULIN SER-MCNC: 3.6 GM/DL (ref 2.4–3.5)
GLUCOSE SERPL-MCNC: 99 MG/DL (ref 82–115)
GLUCOSE UR QL STRIP: NORMAL
HCT VFR BLD AUTO: 50.8 % (ref 37–47)
HGB BLD-MCNC: 16.3 G/DL (ref 12–16)
HGB UR QL STRIP: NEGATIVE
IMM GRANULOCYTES # BLD AUTO: 0.02 X10(3)/MCL (ref 0–0.04)
IMM GRANULOCYTES NFR BLD AUTO: 0.3 %
KETONES UR QL STRIP: NEGATIVE
LEUKOCYTE ESTERASE UR QL STRIP: NEGATIVE
LYMPHOCYTES # BLD AUTO: 1.67 X10(3)/MCL (ref 0.6–4.6)
LYMPHOCYTES NFR BLD AUTO: 21.8 %
MAGNESIUM SERPL-MCNC: 2.4 MG/DL (ref 1.6–2.6)
MCH RBC QN AUTO: 26.5 PG (ref 27–31)
MCHC RBC AUTO-ENTMCNC: 32.1 G/DL (ref 33–36)
MCV RBC AUTO: 82.6 FL (ref 80–94)
MONOCYTES # BLD AUTO: 0.52 X10(3)/MCL (ref 0.1–1.3)
MONOCYTES NFR BLD AUTO: 6.8 %
NEUTROPHILS # BLD AUTO: 5.18 X10(3)/MCL (ref 2.1–9.2)
NEUTROPHILS NFR BLD AUTO: 67.5 %
NITRITE UR QL STRIP: NEGATIVE
NRBC BLD AUTO-RTO: 0 %
OHS QRS DURATION: 88 MS
OHS QTC CALCULATION: 444 MS
PH UR STRIP: 7 [PH]
PLATELET # BLD AUTO: 313 X10(3)/MCL (ref 130–400)
PMV BLD AUTO: 9.1 FL (ref 7.4–10.4)
POTASSIUM SERPL-SCNC: 5.2 MMOL/L (ref 3.5–5.1)
PROT SERPL-MCNC: 7.6 GM/DL (ref 5.8–7.6)
PROT UR QL STRIP: NEGATIVE
RBC # BLD AUTO: 6.15 X10(6)/MCL (ref 4.2–5.4)
RBC #/AREA URNS AUTO: ABNORMAL /HPF
SODIUM SERPL-SCNC: 136 MMOL/L (ref 136–145)
SP GR UR STRIP.AUTO: 1 (ref 1–1.03)
SQUAMOUS #/AREA URNS LPF: ABNORMAL /HPF
TROPONIN I SERPL-MCNC: <0.01 NG/ML (ref 0–0.04)
UROBILINOGEN UR STRIP-ACNC: NORMAL
WBC # BLD AUTO: 7.66 X10(3)/MCL (ref 4.5–11.5)
WBC #/AREA URNS AUTO: ABNORMAL /HPF

## 2025-07-14 PROCEDURE — 93005 ELECTROCARDIOGRAM TRACING: CPT

## 2025-07-14 PROCEDURE — 99285 EMERGENCY DEPT VISIT HI MDM: CPT | Mod: 25

## 2025-07-14 PROCEDURE — 85025 COMPLETE CBC W/AUTO DIFF WBC: CPT

## 2025-07-14 PROCEDURE — 80053 COMPREHEN METABOLIC PANEL: CPT

## 2025-07-14 PROCEDURE — 93010 ELECTROCARDIOGRAM REPORT: CPT | Mod: ,,, | Performed by: INTERNAL MEDICINE

## 2025-07-14 PROCEDURE — 81015 MICROSCOPIC EXAM OF URINE: CPT

## 2025-07-14 PROCEDURE — 63600175 PHARM REV CODE 636 W HCPCS: Performed by: EMERGENCY MEDICINE

## 2025-07-14 PROCEDURE — 96374 THER/PROPH/DIAG INJ IV PUSH: CPT

## 2025-07-14 PROCEDURE — 96361 HYDRATE IV INFUSION ADD-ON: CPT

## 2025-07-14 PROCEDURE — 83735 ASSAY OF MAGNESIUM: CPT

## 2025-07-14 PROCEDURE — 84484 ASSAY OF TROPONIN QUANT: CPT

## 2025-07-14 PROCEDURE — 96375 TX/PRO/DX INJ NEW DRUG ADDON: CPT

## 2025-07-14 PROCEDURE — 25000003 PHARM REV CODE 250: Performed by: EMERGENCY MEDICINE

## 2025-07-14 RX ORDER — VALSARTAN 320 MG/1
320 TABLET ORAL DAILY
Qty: 90 TABLET | Refills: 3 | Status: SHIPPED | OUTPATIENT
Start: 2025-07-14 | End: 2026-07-14

## 2025-07-14 RX ORDER — ENALAPRILAT 1.25 MG/ML
2.5 INJECTION INTRAVENOUS
Status: COMPLETED | OUTPATIENT
Start: 2025-07-14 | End: 2025-07-14

## 2025-07-14 RX ORDER — METOPROLOL TARTRATE 1 MG/ML
5 INJECTION, SOLUTION INTRAVENOUS
Status: COMPLETED | OUTPATIENT
Start: 2025-07-14 | End: 2025-07-14

## 2025-07-14 RX ADMIN — SODIUM CHLORIDE, POTASSIUM CHLORIDE, SODIUM LACTATE AND CALCIUM CHLORIDE 1000 ML: 600; 310; 30; 20 INJECTION, SOLUTION INTRAVENOUS at 02:07

## 2025-07-14 RX ADMIN — METOPROLOL TARTRATE 5 MG: 1 INJECTION, SOLUTION INTRAVENOUS at 02:07

## 2025-07-14 RX ADMIN — ENALAPRILAT 2.5 MG: 2.5 INJECTION INTRAVENOUS at 04:07

## 2025-07-14 NOTE — TELEPHONE ENCOUNTER
Pt c/o dizziness and feeling flushed. Symptoms began this morning when she woke up. Her initial BP was 163/92 and after taking her medication it was 157/87. Current HR 67.       Reason for Disposition   Lightheadedness (dizziness) present now, after 2 hours of rest and fluids    Additional Information   Negative: SEVERE difficulty breathing (e.g., struggling for each breath, speaks in single words)   Negative: Shock suspected (e.g., cold/pale/clammy skin, too weak to stand, low BP, rapid pulse)   Negative: Difficult to awaken or acting confused (e.g., disoriented, slurred speech)   Negative: Fainted, and still feels dizzy afterwards   Negative: Overdose (accidental or intentional) of medications   Negative: New neurologic deficit that is present now: * Weakness of the face, arm, or leg on one side of the body * Numbness of the face, arm, or leg on one side of the body * Loss of speech or garbled speech   Negative: Heart beating < 50 beats per minute OR > 140 beats per minute   Negative: Sounds like a life-threatening emergency to the triager   Negative: SEVERE dizziness (e.g., unable to stand, requires support to walk, feels like passing out now)   Negative: SEVERE headache or neck pain   Negative: Spinning or tilting sensation (vertigo) present now and one or more stroke risk factors (i.e., hypertension, diabetes mellitus, prior stroke/TIA, heart attack, age over 60) (Exception: Prior physician evaluation for this AND no different/worse than usual.)   Negative: Neurologic deficit that was brief (now gone), ANY of the following:* Weakness of the face, arm, or leg on one side of the body* Numbness of the face, arm, or leg on one side of the body* Loss of speech or garbled speech   Negative: Loss of vision or double vision  (Exception: Similar to previous migraines.)   Negative: Extra heartbeats, irregular heart beating, or heart is beating very fast (i.e., 'palpitations')   Negative: Difficulty breathing   Negative:  Drinking very little and dehydration suspected (e.g., no urine > 12 hours, very dry mouth, very lightheaded)   Negative: Follows bleeding (e.g., stomach, rectum, vagina)  (Exception: Became dizzy from sight of small amount blood.)   Negative: Patient sounds very sick or weak to the triager    Protocols used: Dizziness-A-OH

## 2025-07-14 NOTE — TELEPHONE ENCOUNTER
Spoke with pt regarding symptoms pt is experiencing dizziness, weakness, and high blood pressure. Advised pt to visit nearest ER for further evaluation due to the symptoms she is experiencing

## 2025-07-14 NOTE — FIRST PROVIDER EVALUATION
"Medical screening examination initiated.  I have conducted a focused provider triage encounter, findings are as follows:    Brief history of present illness:  79 year old female presents to ER with c/o dizziness since 0700 this morning. Patient reports recent admission for same. Hx of A fib with cardioversion on 07/07/25    Vitals:    07/14/25 0936   BP: (!) 166/84   Pulse: 65   Resp: 18   Temp: 98.3 °F (36.8 °C)   TempSrc: Oral   SpO2: 97%   Weight: 81.6 kg (180 lb)   Height: 5' 3" (1.6 m)       Pertinent physical exam:  awake and alert, nad    Brief workup plan:  Labs, EKG, imaging     Preliminary workup initiated; this workup will be continued and followed by the physician or advanced practice provider that is assigned to the patient when roomed.  "

## 2025-07-14 NOTE — ED PROVIDER NOTES
Encounter Date: 7/14/2025       History     Chief Complaint   Patient presents with    Dizziness     Pt to ED via POV. Pt sent from 's office for dizziness since approx 7 this morning. Denies CP and SOB. Pt admitted on 7/10 for the same thing.      The history is provided by the patient and a relative.   Dizziness  This is a recurrent problem. The current episode started 6 to 12 hours ago. The problem has not changed since onset.Pertinent negatives include no chest pain and no shortness of breath. Nothing aggravates the symptoms. Nothing relieves the symptoms.   Recently admitted for similar symptoms and found to have CVA which was felt to be either embolic or post-procedural (underwent DCCV on 7/1).  Has been on Eliquis since May.  Reports BP has been erratic since changing from metoprolol to sotalol and notes elevated BP this AM.    Review of patient's allergies indicates:   Allergen Reactions    Potassium chloride Other (See Comments)     Passes out     Past Medical History:   Diagnosis Date    Anxiety disorder, unspecified     Essential (primary) hypertension     Hypothyroidism, unspecified     Osteopenia     Vitamin D deficiency      Past Surgical History:   Procedure Laterality Date    APPENDECTOMY      CHOLECYSTECTOMY      HYSTERECTOMY      TONSILLECTOMY      TONSILLECTOMY       Family History   Problem Relation Name Age of Onset    Hypertension Mother Kaylee perez     Asthma Mother Kaylee perez     Cancer Father Sammie banegasblanc     Diabetes Father Sammie banegasblanc      Social History[1]  Review of Systems   Constitutional:  Negative for fever.   HENT:  Negative for sore throat.    Respiratory:  Negative for shortness of breath.    Cardiovascular:  Negative for chest pain.   Gastrointestinal:  Negative for nausea.   Genitourinary:  Negative for dysuria.   Musculoskeletal:  Negative for back pain.   Skin:  Negative for rash.   Neurological:  Positive for dizziness. Negative for weakness.   Hematological:   Does not bruise/bleed easily.       Physical Exam     Initial Vitals [07/14/25 0936]   BP Pulse Resp Temp SpO2   (!) 166/84 65 18 98.3 °F (36.8 °C) 97 %      MAP       --         Physical Exam    Nursing note and vitals reviewed.  Constitutional: She appears well-developed and well-nourished.   HENT:   Head: Normocephalic and atraumatic.   Right Ear: External ear normal.   Left Ear: External ear normal.   Eyes: Conjunctivae and EOM are normal. Pupils are equal, round, and reactive to light.   Neck: Neck supple.   Normal range of motion.  Cardiovascular:  Normal rate, regular rhythm, normal heart sounds and intact distal pulses.           Pulmonary/Chest: Breath sounds normal.   Abdominal: Abdomen is soft. Bowel sounds are normal.   Musculoskeletal:         General: Normal range of motion.      Cervical back: Normal range of motion and neck supple.     Neurological: She is alert and oriented to person, place, and time. GCS score is 15. GCS eye subscore is 4. GCS verbal subscore is 5. GCS motor subscore is 6.   Skin: Skin is warm and dry. Capillary refill takes less than 2 seconds.   Psychiatric: She has a normal mood and affect. Her behavior is normal. Judgment and thought content normal.         ED Course   Procedures  Labs Reviewed   COMPREHENSIVE METABOLIC PANEL - Abnormal       Result Value    Sodium 136      Potassium 5.2 (*)     Chloride 103      CO2 25      Glucose 99      Blood Urea Nitrogen 12.1      Creatinine 0.75      Calcium 10.3 (*)     Protein Total 7.6      Albumin 4.0      Globulin 3.6 (*)     Albumin/Globulin Ratio 1.1      Bilirubin Total 1.0      ALP 95      ALT 19      AST 23      eGFR >60      Anion Gap 8.0      BUN/Creatinine Ratio 16     URINALYSIS, REFLEX TO URINE CULTURE - Abnormal    Color, UA Colorless      Appearance, UA Clear      Specific Gravity, UA 1.003 (*)     pH, UA 7.0      Protein, UA Negative      Glucose, UA Normal      Ketones, UA Negative      Blood, UA Negative       Bilirubin, UA Negative      Urobilinogen, UA Normal      Nitrites, UA Negative      Leukocyte Esterase, UA Negative      RBC, UA None Seen      WBC, UA None Seen      Bacteria, UA None Seen      Squamous Epithelial Cells, UA Trace     CBC WITH DIFFERENTIAL - Abnormal    WBC 7.66      RBC 6.15 (*)     Hgb 16.3 (*)     Hct 50.8 (*)     MCV 82.6      MCH 26.5 (*)     MCHC 32.1 (*)     RDW 15.0      Platelet 313      MPV 9.1      Neut % 67.5      Lymph % 21.8      Mono % 6.8      Eos % 2.9      Basophil % 0.7      Imm Grans % 0.3      Neut # 5.18      Lymph # 1.67      Mono # 0.52      Eos # 0.22      Baso # 0.05      Imm Gran # 0.02      NRBC% 0.0     MAGNESIUM - Normal    Magnesium Level 2.40     TROPONIN I - Normal    Troponin-I <0.010     CBC W/ AUTO DIFFERENTIAL    Narrative:     The following orders were created for panel order CBC auto differential.  Procedure                               Abnormality         Status                     ---------                               -----------         ------                     CBC with Differential[[id number]]       Abnormal            Final result                 Please view results for these tests on the individual orders.          Imaging Results              CT Head Without Contrast (Final result)  Result time 07/14/25 10:44:40      Final result by Gamaliel White MD (07/14/25 10:44:40)                   Impression:      Stable subacute infarcts without hemorrhagic transformation.  No new findings.      Electronically signed by: Gamaliel White  Date:    07/14/2025  Time:    10:44               Narrative:    EXAMINATION:  CT HEAD WITHOUT CONTRAST    CLINICAL HISTORY:  Dizziness, persistent/recurrent, cardiac or vascular cause suspected;    TECHNIQUE:  Sequential axial images were performed of the brain without contrast.    Dose product length of 906 mGycm. Automated exposure control was utilized to minimize radiation dose.    COMPARISON:  CT brain and MRI brain July  10, 2025.    FINDINGS:  There is right cerebellar subacute infarct with about similar size since the previous MRI brain and is seen on the image 13 series 2.  Right occipital lobe minimal infarcts seen on previous MRI brain are difficult to appreciate due to the size on the CT brain.  There is no new large vessel territory infarct.  There is no acute intracranial hemorrhage, hydrocephalus, midline shift or mass effect.  Minimal chronic microvascular ischemia and mild atrophy. There is no acute extra axial fluid collection.    Bilateral maxillary sinuses retention cysts.  Otherwise, visualized paranasal sinuses are clear without mucosal thickening, polypoidal abnormality or air-fluid levels. Mastoid air cells aeration is optimal.                                       Medications   lactated ringers bolus 1,000 mL (0 mLs Intravenous Stopped 7/14/25 1550)   metoprolol injection 5 mg (5 mg Intravenous Given 7/14/25 1450)   enalaprilat injection 2.5 mg (2.5 mg Intravenous Given 7/14/25 1603)     Medical Decision Making  Amount and/or Complexity of Data Reviewed  Labs: ordered. Decision-making details documented in ED Course.  Radiology: ordered. Decision-making details documented in ED Course.  ECG/medicine tests: ordered and independent interpretation performed. Decision-making details documented in ED Course.    Risk  Prescription drug management.    Differential includes:  evolution of CVA, acute CVA, HTN, medication side effect, dehydration, electrolyte disturbance, anemia, infectious process.  Will obtain head CT, CBC, CMP, troponin, mag, EKG and give antihypertensive.  Will discuss with [facility] regarding medications.  Consider admission for refractory symptoms.           ED Course as of 07/14/25 1640   Mon Jul 14, 2025   1405 I spoke with KALEY Barney (cardiology) - states can D/C losartan and start valsartan 320 mg daily for BP control.  Patient has F/U on 7/23 with Dr. Johnston. [CL]   1638 BP improved as have symptoms.   Explained [facility] recommendations.  Patient and daughter are in agreement.  Confirmed F/U appointment on 7/23. [CL]      ED Course User Index  [CL] Mark Padron MD                               Clinical Impression:  Final diagnoses:  [R42] Dizziness (Primary)  [I10] Primary hypertension          ED Disposition Condition    Discharge Stable          ED Prescriptions       Medication Sig Dispense Start Date End Date Auth. Provider    valsartan (DIOVAN) 320 MG tablet Take 1 tablet (320 mg total) by mouth once daily. 90 tablet 7/14/2025 7/14/2026 Mark Padron MD          Follow-up Information       Follow up With Specialties Details Why Contact Info    Keep your appointment with Dr. Johnston on 7/23/25.                       [1]   Social History  Tobacco Use    Smoking status: Never    Smokeless tobacco: Never   Substance Use Topics    Alcohol use: Not Currently    Drug use: Never        Mark Padron MD  07/14/25 1640

## 2025-07-15 ENCOUNTER — PATIENT OUTREACH (OUTPATIENT)
Dept: ADMINISTRATIVE | Facility: CLINIC | Age: 80
End: 2025-07-15
Payer: MEDICARE

## 2025-07-15 NOTE — PROGRESS NOTES
C3 nurse spoke with Jen Fitzgerald for a TCC post hospital discharge follow up call. The patient has a scheduled HOSFU appointment with Blayne Bautista MD Tuesday Jul 29, 2025 10:20 AM.

## 2025-07-18 NOTE — DISCHARGE SUMMARY
"[facility] Hospital Medicine Discharge Summary    Admit Date: 7/10/2025  Discharge Date and Time: 7/12/25  Admitting Physician:  Team  Discharging Physician: Dee Gloria MD.  Primary Care Physician: Blayne Bautista MD  Consults: Cardiology and Neurology    Discharge Diagnoses:  CVA  Etiology: concerning for embolic vs post procedural  History of hypertension, Afib, hypothyroidism    Hospital Course:   Jen Fitzgerald is a 79 y.o. female who  has a past medical history of Anxiety disorder, unspecified, Essential (primary) hypertension, Hypothyroidism, unspecified, Osteopenia, and Vitamin D deficiency.. The patient presented to [facility] on 7/10/2025 with a primary complaint of dizziness.  Patient reported cardioversion on Monday and has had episode of dizziness lightheadedness and headaches since procedure .  Denies any loss of consciousness.  Denies any weakness, numbness, vision changes, speech difficultes.  Denies any chest pain or shortness on breath or any palpitations.  Denies any fevers or chills.  Denies any pain anywhere else or any other complaints.     On arrival, afebrile, mildly hypertensive, on room air.  Stroke workup was ordered. CTh: no acute abnormality   CTA h/n: Very minimal calcification in the cavernous carotids bilaterally without flow limiting stenosis.  MRI brain: . A few small foci of restricted diffusion which are dark on ADC map and bright on FLAIR, consistent with acute infarcts are seen in the subcortical right occipital lobe as well as in the right cerebellar hemisphere.  Neurology was consulted.  Admitted to Hospital Medicine for further evaluation .      Per NEUROLOGY,Assuming that pt's a fib has not be "cured" and she will likely continue to have paroxysmal a fib,  would recommend continuing Eliquis.  If cardiology feels she is no longer going to have a fib and they recommend discontinuing Eliquis, then we would want pt on DAPT for 21, then ASA " alone.  Otherwise, no additional neuro-specific w/u or tx indicated at this time.      Cardiology was consulted.Recommended to continue Eliquis.    PT cleared for discharge.     Pt was seen and examined on the day of discharge.Patient was stable on discharge,all questions were answered and emphasized the importance of follow ups.Discussed to follow up with Cardiology on cardiac medications.Patient and family verbalized understanding.    Vitals:  VITAL SIGNS: 24 HRS MIN & MAX LAST   No data recorded 97.8 °F (36.6 °C)   No data recorded (!) 163/91   No data recorded  (!) 59   No data recorded 17   No data recorded 96 %       Physical Exam:  General appearance: Well-developed, well-nourished female in no apparent distress.  HENT: Atraumatic head. Moist mucous membranes of oral cavity.  Eyes: Normal extraocular movements.   Neck: Supple.   Lungs: Clear to auscultation bilaterally. No wheezing present.   Heart:  S1 and S2 present with no appreciable murmurs/gallop/rub.   Abdomen: Soft, non-distended, non-tender. No rebound tenderness/guarding. Bowel sounds are normal.   Extremities: No cyanosis, clubbing, or edema.  Skin: No Rash.   Neuro: Motor and sensory exams grossly intact. Good tone. Muscle strength 5/5 in all 4 extremities  Psych/mental status: Appropriate mood and affect. Responds appropriately to questions.     Procedures Performed: see full chart    Significant Diagnostic Studies: See Full reports for all details    Recent Labs   Lab 07/11/25  0356 07/14/25  1028   WBC 8.59 7.66   RBC 5.69* 6.15*   HGB 15.0 16.3*   HCT 47.4* 50.8*   MCV 83.3 82.6   MCH 26.4* 26.5*   MCHC 31.6* 32.1*   RDW 14.8 15.0    313   MPV 8.6 9.1       Recent Labs   Lab 07/11/25  0356 07/14/25  1028    136   K 3.8 5.2*   * 103   CO2 21* 25   BUN 7.7* 12.1   CREATININE 0.64 0.75   GLU 94 99   CALCIUM 9.1 10.3*   MG 2.00 2.40   ALBUMIN 3.7 4.0   PROT 6.9 7.6   ALKPHOS 86 95   ALT 19 19   AST 21 23   BILITOT 1.0 1.0         Microbiology Results (last 7 days)       ** No results found for the last 168 hours. **             CT Head Without Contrast  Narrative: EXAMINATION:  CT HEAD WITHOUT CONTRAST    CLINICAL HISTORY:  Dizziness, persistent/recurrent, cardiac or vascular cause suspected;    TECHNIQUE:  Sequential axial images were performed of the brain without contrast.    Dose product length of 906 mGycm. Automated exposure control was utilized to minimize radiation dose.    COMPARISON:  CT brain and MRI brain July 10, 2025.    FINDINGS:  There is right cerebellar subacute infarct with about similar size since the previous MRI brain and is seen on the image 13 series 2.  Right occipital lobe minimal infarcts seen on previous MRI brain are difficult to appreciate due to the size on the CT brain.  There is no new large vessel territory infarct.  There is no acute intracranial hemorrhage, hydrocephalus, midline shift or mass effect.  Minimal chronic microvascular ischemia and mild atrophy. There is no acute extra axial fluid collection.    Bilateral maxillary sinuses retention cysts.  Otherwise, visualized paranasal sinuses are clear without mucosal thickening, polypoidal abnormality or air-fluid levels. Mastoid air cells aeration is optimal.  Impression: Stable subacute infarcts without hemorrhagic transformation.  No new findings.    Electronically signed by: Gamaliel White  Date:    07/14/2025  Time:    10:44         Medication List        START taking these medications      atorvastatin 40 MG tablet  Commonly known as: LIPITOR  Take 1 tablet (40 mg total) by mouth once daily. Further refills by PCP            CHANGE how you take these medications      sotaloL 80 MG tablet  Commonly known as: BETAPACE  Take 1 tablet (80 mg total) by mouth 2 (two) times daily. Defer to cardiology  What changed: additional instructions            CONTINUE taking these medications      amLODIPine 5 MG tablet  Commonly known as: NORVASC  TAKE ONE TABLET  BY MOUTH EACH MORNING     apixaban 5 mg Tab  Commonly known as: ELIQUIS  Take 1 tablet (5 mg total) by mouth 2 (two) times daily.     * NP THYROID 15 mg Tab  Generic drug: thyroid (pork)     * ARMOUR THYROID 60 mg Tab  Generic drug: thyroid (pork)  TAKE ONE TABLET BY MOUTH EACH MORNING     * busPIRone 15 MG tablet  Commonly known as: BUSPAR     * busPIRone 5 MG Tab  Commonly known as: BUSPAR     metoprolol succinate 100 MG 24 hr tablet  Commonly known as: TOPROL-XL     vitamin D 1000 units Tab  Commonly known as: VITAMIN D3           * This list has 4 medication(s) that are the same as other medications prescribed for you. Read the directions carefully, and ask your doctor or other care provider to review them with you.                   Where to Get Your Medications        These medications were sent to [facility] - [place], [place] - [address] [place] [address]      Phone: [phone]   atorvastatin 40 MG tablet       Information about where to get these medications is not yet available    Ask your nurse or doctor about these medications  sotaloL 80 MG tablet          Explained in detail to the patient about the discharge plan, medications, and follow-up visits. Pt understands and agrees with the treatment plan  Discharge Disposition: Home or Self Care   Discharged Condition: stable  Diet-    Medications Per DC med rec  Activities as tolerated   Follow-up Information       Treva Watt, CARYLP. Schedule an appointment as soon as possible for a visit in 3 month(s).    Specialty: Neurology  Contact information:  [address] [place] [address] [address] [place] [id number] [phone]               Hannah Blankenship MD Follow up in 2 week(s).    Specialties: Cardiovascular Disease, Cardiology  Contact information:  [address] [place] [address]  [place] [id number] [phone]                           For further questions contact hospitalist office    Discharge time 33 minutes    For  worsening symptoms, chest pain, shortness of breath, increased abdominal pain, high grade fever, stroke or stroke like symptoms, immediately go to the nearest Emergency Room or call 911 as soon as possible.      Dee Matias M.D, on 7/17/2025. at 7:44 PM.

## 2025-07-19 ENCOUNTER — PATIENT OUTREACH (OUTPATIENT)
Dept: ADMINISTRATIVE | Facility: OTHER | Age: 80
End: 2025-07-19
Payer: MEDICARE

## 2025-07-19 ENCOUNTER — TELEPHONE (OUTPATIENT)
Dept: ADMINISTRATIVE | Facility: CLINIC | Age: 80
End: 2025-07-19
Payer: MEDICARE

## 2025-07-19 NOTE — PROGRESS NOTES
CHW - Outreach Attempt    Community Health Worker left a voicemail message for 1st attempt to contact patient regardinst missed initial outreach visit attempt call.

## 2025-07-19 NOTE — PROGRESS NOTES
CHW - Case Closure    This Community Health Worker spoke to patient via telephone today.   Pt reported: Patient denied needs.   Pt denied any additional needs at this time and agrees with episode closure at this time.  Provided patient with Community Health Worker's contact information and encouraged her to contact this Community Health Worker if additional needs arise.

## 2025-07-22 ENCOUNTER — TELEPHONE (OUTPATIENT)
Dept: INTERNAL MEDICINE | Facility: CLINIC | Age: 80
End: 2025-07-22
Payer: MEDICARE

## 2025-07-22 NOTE — TELEPHONE ENCOUNTER
----- Message from Med Assistant Jolly sent at 7/14/2025  4:43 PM CDT -----  Regarding: Pre-Visits 07/29/2025  Patient has an appointment on 7/29  Fasting labs not needed  Please call and remind patient of appointment

## 2025-07-29 ENCOUNTER — OFFICE VISIT (OUTPATIENT)
Dept: INTERNAL MEDICINE | Facility: CLINIC | Age: 80
End: 2025-07-29
Payer: MEDICARE

## 2025-07-29 VITALS
DIASTOLIC BLOOD PRESSURE: 86 MMHG | SYSTOLIC BLOOD PRESSURE: 130 MMHG | HEIGHT: 63 IN | WEIGHT: 175 LBS | OXYGEN SATURATION: 97 % | HEART RATE: 62 BPM | BODY MASS INDEX: 31.01 KG/M2 | RESPIRATION RATE: 16 BRPM

## 2025-07-29 DIAGNOSIS — E03.9 ACQUIRED HYPOTHYROIDISM: Chronic | ICD-10-CM

## 2025-07-29 DIAGNOSIS — I10 ESSENTIAL (PRIMARY) HYPERTENSION: ICD-10-CM

## 2025-07-29 DIAGNOSIS — I10 ESSENTIAL (PRIMARY) HYPERTENSION: Primary | Chronic | ICD-10-CM

## 2025-07-29 DIAGNOSIS — D75.1 POLYCYTHEMIA: ICD-10-CM

## 2025-07-29 DIAGNOSIS — I48.11 LONGSTANDING PERSISTENT ATRIAL FIBRILLATION: Chronic | ICD-10-CM

## 2025-07-29 DIAGNOSIS — I63.00 CEREBROVASCULAR ACCIDENT (CVA) DUE TO THROMBOSIS OF PRECEREBRAL ARTERY: Chronic | ICD-10-CM

## 2025-07-29 PROBLEM — I48.91 ATRIAL FIBRILLATION: Chronic | Status: ACTIVE | Noted: 2025-04-24

## 2025-07-29 PROBLEM — I63.9 STROKE: Chronic | Status: ACTIVE | Noted: 2025-07-11

## 2025-07-29 PROCEDURE — 1160F RVW MEDS BY RX/DR IN RCRD: CPT | Mod: CPTII,,, | Performed by: INTERNAL MEDICINE

## 2025-07-29 PROCEDURE — 3079F DIAST BP 80-89 MM HG: CPT | Mod: CPTII,,, | Performed by: INTERNAL MEDICINE

## 2025-07-29 PROCEDURE — 3075F SYST BP GE 130 - 139MM HG: CPT | Mod: CPTII,,, | Performed by: INTERNAL MEDICINE

## 2025-07-29 PROCEDURE — 1101F PT FALLS ASSESS-DOCD LE1/YR: CPT | Mod: CPTII,,, | Performed by: INTERNAL MEDICINE

## 2025-07-29 PROCEDURE — 99214 OFFICE O/P EST MOD 30 MIN: CPT | Mod: ,,, | Performed by: INTERNAL MEDICINE

## 2025-07-29 PROCEDURE — 1159F MED LIST DOCD IN RCRD: CPT | Mod: CPTII,,, | Performed by: INTERNAL MEDICINE

## 2025-07-29 PROCEDURE — 1111F DSCHRG MED/CURRENT MED MERGE: CPT | Mod: CPTII,,, | Performed by: INTERNAL MEDICINE

## 2025-07-29 PROCEDURE — 1126F AMNT PAIN NOTED NONE PRSNT: CPT | Mod: CPTII,,, | Performed by: INTERNAL MEDICINE

## 2025-07-29 PROCEDURE — 3288F FALL RISK ASSESSMENT DOCD: CPT | Mod: CPTII,,, | Performed by: INTERNAL MEDICINE

## 2025-07-29 RX ORDER — ROSUVASTATIN CALCIUM 10 MG/1
10 TABLET, COATED ORAL DAILY
Qty: 30 TABLET | Refills: 5 | Status: SHIPPED | OUTPATIENT
Start: 2025-07-29 | End: 2026-07-29

## 2025-07-29 RX ORDER — AMLODIPINE BESYLATE 5 MG/1
TABLET ORAL
Qty: 30 TABLET | Refills: 11 | Status: SHIPPED | OUTPATIENT
Start: 2025-07-29

## 2025-07-29 RX ORDER — THYROID 15 MG/1
15 TABLET ORAL
COMMUNITY
Start: 2025-05-05

## 2025-07-29 NOTE — PROGRESS NOTES
Blayne Cee MD        PATIENT NAME: Jen Fitzgerald  : 1945  DATE: 25  MRN: [id number]      Billing Provider: Blayne Cee MD  Level of Service: AR OFFICE/OUTPT VISIT, DAREN, DARSHAN IV, 30-39 MIN  Patient PCP Information       Provider PCP Type    Blayne Cee MD General            Reason for Visit / Chief Complaint: Hypertension (Patient is here for a 3 month hypertension follow up. Patient has been in and out of hospitals with multiple issues.)       Update PCP  Update Chief Complaint         History of Present Illness / Problem Focused Workflow     Jen Fitzgerald presents to the clinic with Hypertension (Patient is here for a 3 month hypertension follow up. Patient has been in and out of hospitals with multiple issues.)     Jen is here hospital follow-up   She is admitted earlier this month with a stroke confirmed by MRI   She was also an intermittent AFib and was cardioverted and now under treatment   Her cardiologist's recommending she has starts statin therapy as a preventative.        Review of Systems   Review of Systems   Constitutional: Negative.    HENT: Negative.     Eyes: Negative.    Respiratory: Negative.     Cardiovascular: Negative.    Gastrointestinal: Negative.    Endocrine: Negative.    Genitourinary: Negative.    Musculoskeletal: Negative.    Integumentary:  Negative.   Neurological: Negative.    Psychiatric/Behavioral: Negative.          Medical / Social / Family History     Past Medical History:   Diagnosis Date    Anxiety disorder, unspecified     Essential (primary) hypertension     Hypothyroidism, unspecified     Osteopenia     Vitamin D deficiency        Past Surgical History:   Procedure Laterality Date    APPENDECTOMY      CHOLECYSTECTOMY      HYSTERECTOMY      TONSILLECTOMY      TONSILLECTOMY         Social History  Ms. Fitzgerald  reports that she has never smoked. She has never used smokeless tobacco. She reports that she does not currently use alcohol.  She reports that she does not use drugs.    Family History  Ms.'s Fitzgerald  family history includes Asthma in her mother; Cancer in her father; Diabetes in her father; Hypertension in her mother.    Medications and Allergies     Medications  Outpatient Medications Marked as Taking for the 7/29/25 encounter (Office Visit) with Blayne Bautista MD   Medication Sig Dispense Refill    amLODIPine (NORVASC) 5 MG tablet TAKE ONE TABLET BY MOUTH EACH MORNING 30 tablet 11    apixaban (ELIQUIS) 5 mg Tab Take 1 tablet (5 mg total) by mouth 2 (two) times daily. 60 tablet 5    ARMOUR THYROID 60 mg Tab TAKE ONE TABLET BY MOUTH EACH MORNING 30 tablet 11    sotaloL (BETAPACE) 80 MG tablet Take 1 tablet (80 mg total) by mouth 2 (two) times daily. Defer to cardiology      thyroid, pork, (ARMOUR THYROID) 15 mg Tab Take 15 mg by mouth before breakfast.      valsartan (DIOVAN) 320 MG tablet Take 1 tablet (320 mg total) by mouth once daily. 90 tablet 3    vitamin D (VITAMIN D3) 1000 units Tab Take 5,000 Units by mouth every other day.         Allergies  Review of patient's allergies indicates:   Allergen Reactions    Potassium chloride Other (See Comments)     Passes out       Physical Examination     Vitals:    07/29/25 1032   BP: 130/86   Pulse: 62   Resp: 16     Physical Exam  Constitutional:       Appearance: Normal appearance.   HENT:      Head: Normocephalic and atraumatic.      Right Ear: Tympanic membrane normal.      Left Ear: Tympanic membrane normal.      Nose: Nose normal.      Mouth/Throat:      Mouth: Mucous membranes are moist.   Eyes:      Extraocular Movements: Extraocular movements intact.      Pupils: Pupils are equal, round, and reactive to light.   Cardiovascular:      Rate and Rhythm: Normal rate and regular rhythm.      Pulses: Normal pulses.   Pulmonary:      Effort: Pulmonary effort is normal.      Breath sounds: Normal breath sounds.   Abdominal:      General: Abdomen is flat. Bowel sounds are normal.       Palpations: Abdomen is soft.   Musculoskeletal:         General: Normal range of motion.      Cervical back: Normal range of motion and neck supple.   Skin:     General: Skin is warm and dry.   Neurological:      General: No focal deficit present.      Mental Status: She is alert and oriented to person, place, and time.   Psychiatric:         Mood and Affect: Mood normal.         Behavior: Behavior normal.          Assessment and Plan (including Health Maintenance)      Problem List  Smart Sets  Document Outside HM   :    Plan:    ICD-10-CM ICD-9-CM   1. Essential (primary) hypertension  I10 401.9   2. Cerebrovascular accident (CVA) due to thrombosis of precerebral artery  I63.00 433.91   3. Acquired hypothyroidism  E03.9 244.9   4. Longstanding persistent atrial fibrillation  I48.11 427.31   5. Polycythemia  D75.1 238.4       Problem List Items Addressed This Visit          Neuro    Stroke (Chronic)    Review of all data in the chart from her hospitalization including MRI scanning and doctor's notes this took over 20 minutes   Recommend beginning Crestor twice a day week to see if she can tolerate it she can not tolerate daily statin   Begin physical therapy            Cardiac/Vascular    Essential (primary) hypertension - Primary (Chronic)    Good blood pressure control continue Betapace and amlodipine         Atrial fibrillation (Chronic)    Sinus rhythm today continue Eliquis            Oncology    Polycythemia    Significant increase in hemoglobin   Recommend donating 1 unit of blood            Endocrine    Hypothyroidism, unspecified (Chronic)    Stable continue Synthroid                   Health Maintenance Due   Topic Date Due    TETANUS VACCINE  Never done    Shingles Vaccine (1 of 2) Never done    Pneumococcal Vaccines (Age 50+) (1 of 1 - PCV) Never done    RSV Vaccine (Age 60+ and Pregnant patients) (1 - 1-dose 75+ series) Never done    DEXA Scan  11/06/2019    COVID-19 Vaccine (1 - 2024-25 season) Never  done       Problem List Items Addressed This Visit          Neuro    Stroke (Chronic)    Current Assessment & Plan   Review of all data in the chart from her hospitalization including MRI scanning and doctor's notes this took over 20 minutes   Recommend beginning Crestor twice a day week to see if she can tolerate it she can not tolerate daily statin   Begin physical therapy            Cardiac/Vascular    Essential (primary) hypertension - Primary (Chronic)    Current Assessment & Plan   Good blood pressure control continue Betapace and amlodipine         Atrial fibrillation (Chronic)    Current Assessment & Plan   Sinus rhythm today continue Eliquis            Oncology    Polycythemia    Current Assessment & Plan   Significant increase in hemoglobin   Recommend donating 1 unit of blood            Endocrine    Hypothyroidism, unspecified (Chronic)    Current Assessment & Plan   Stable continue Synthroid            Health Maintenance Topics with due status: Not Due       Topic Last Completion Date    Influenza Vaccine 12/05/2023    Lipid Panel 07/10/2025       Future Appointments   Date Time Provider Department Center   10/16/2025  9:30 AM Treva Watt FNP [address]   12/22/2025 10:40 AM Blayne Bautista MD [place] [id number]        I spent a total of 38 minutes on the day of the visit.This includes face to face time and non-face to face time preparing to see the patient (eg, review of tests), obtaining and/or reviewing separately obtained history, documenting clinical information in the electronic or other health record, independently interpreting results and communicating results to the patient/family/caregiver, or care coordinator.      Signature:  Blayne Bautista MD  [facility]  [address] [place] [id number]    Date of encounter: 7/29/25

## 2025-07-29 NOTE — ASSESSMENT & PLAN NOTE
Review of all data in the chart from her hospitalization including MRI scanning and doctor's notes this took over 20 minutes   Recommend beginning Crestor twice a day week to see if she can tolerate it she can not tolerate daily statin   Begin physical therapy

## 2025-07-29 NOTE — LETTER
Fax Transmission                                                                                                                                                       Date: 2025       To:  Dianna Lopez PT From: Shanae Acuna MA   Fax:  [phone] Fax: [phone]   Phone:   Phone: [phone]     Special Instructions:     For questions or issues, please contact department listed on attached report                            IF THERE ARE ANY PROBLEMS WITH THIS TRANSMISSION, PLEASE CALL IMMEDIATELY. THANK YOU    CONFIDENTIALITY NOTICE: The accompanying facsimile is intended solely for the use of the recipient designated above. Document(s) transmitted herewith may contain information that is confidential and privileged. Delivery, distribution of dissemination of this communication other than to the intended recipient is strictly prohibited. If you are another healthcare provider and have received this facsimile in error, please properly dispose and notify the sender. If you are NOT a healthcare provider and have received this facsimile in error, please notify [facility]s Compliance & Privacy Department immediately by email at [email or web address]  [address] [place] [id number]  Phone: [phone]  Fax: [phone]    Jen Fitzgerald                                                                                                 MRN: [id number]    Demographics  [address] [place] [address] [phone] (home)     : 1945  Age: 79 y.o.  Sex: female     Guarantors & Coverages    Guarantor Account    Name: JEN FITZGERALD  Address: [address] [place] [address]  City: [place]  State: [place]  Zip Code: [address]  Phone Number: [phone]    Service Area: [facility]  Account Type: Personal/Family  Guarantor Rel. To Patient: Self  Employment Status:  Retired    Coverages    Primary  Payor: Payor: [employer] [facility] MCARE / Plan: [employer] [facility] MEDICARE ADVANTAGE / Product Type: Medicare Advantage /   Plan: [employer] [facility] MEDICARE ADVANTAGE  Subscriber ID #: [id number] - (Managed Medicare)  Group #:   Secondary  Payor:   Plan:   Subscriber ID #:   Group #:

## 2025-08-11 DIAGNOSIS — E03.9 HYPOTHYROIDISM, UNSPECIFIED TYPE: ICD-10-CM

## 2025-08-11 RX ORDER — SULFAMETHOXAZOLE AND TRIMETHOPRIM 800; 160 MG/1; MG/1
TABLET ORAL
Qty: 30 TABLET | Refills: 11 | Status: SHIPPED | OUTPATIENT
Start: 2025-08-11

## 2025-08-19 ENCOUNTER — TELEPHONE (OUTPATIENT)
Dept: ORTHOPEDICS | Facility: CLINIC | Age: 80
End: 2025-08-19
Payer: MEDICARE

## 2025-08-20 ENCOUNTER — OFFICE VISIT (OUTPATIENT)
Dept: ORTHOPEDICS | Facility: CLINIC | Age: 80
End: 2025-08-20
Payer: MEDICARE

## 2025-08-20 DIAGNOSIS — M25.562 LEFT KNEE PAIN, UNSPECIFIED CHRONICITY: Primary | ICD-10-CM

## 2025-08-20 DIAGNOSIS — M17.12 PRIMARY OSTEOARTHRITIS OF LEFT KNEE: ICD-10-CM

## 2025-08-20 PROCEDURE — 20610 DRAIN/INJ JOINT/BURSA W/O US: CPT | Mod: LT,,, | Performed by: ORTHOPAEDIC SURGERY

## 2025-08-20 PROCEDURE — 1160F RVW MEDS BY RX/DR IN RCRD: CPT | Mod: CPTII,,, | Performed by: ORTHOPAEDIC SURGERY

## 2025-08-20 PROCEDURE — 1159F MED LIST DOCD IN RCRD: CPT | Mod: CPTII,,, | Performed by: ORTHOPAEDIC SURGERY

## 2025-08-20 PROCEDURE — 99214 OFFICE O/P EST MOD 30 MIN: CPT | Mod: 25,,, | Performed by: ORTHOPAEDIC SURGERY

## 2025-08-20 PROCEDURE — 3288F FALL RISK ASSESSMENT DOCD: CPT | Mod: CPTII,,, | Performed by: ORTHOPAEDIC SURGERY

## 2025-08-20 PROCEDURE — 1101F PT FALLS ASSESS-DOCD LE1/YR: CPT | Mod: CPTII,,, | Performed by: ORTHOPAEDIC SURGERY

## 2025-08-20 RX ORDER — LIDOCAINE HYDROCHLORIDE 20 MG/ML
2 INJECTION, SOLUTION INFILTRATION; PERINEURAL
Status: DISCONTINUED | OUTPATIENT
Start: 2025-08-20 | End: 2025-08-20 | Stop reason: HOSPADM

## 2025-08-20 RX ORDER — METHYLPREDNISOLONE ACETATE 80 MG/ML
80 INJECTION, SUSPENSION INTRA-ARTICULAR; INTRALESIONAL; INTRAMUSCULAR; SOFT TISSUE
Status: DISCONTINUED | OUTPATIENT
Start: 2025-08-20 | End: 2025-08-20 | Stop reason: HOSPADM

## 2025-08-20 RX ADMIN — LIDOCAINE HYDROCHLORIDE 2 MG: 20 INJECTION, SOLUTION INFILTRATION; PERINEURAL at 08:08

## 2025-08-20 RX ADMIN — METHYLPREDNISOLONE ACETATE 80 MG: 80 INJECTION, SUSPENSION INTRA-ARTICULAR; INTRALESIONAL; INTRAMUSCULAR; SOFT TISSUE at 08:08

## 2025-08-25 PROBLEM — R42 DIZZINESS: Status: RESOLVED | Noted: 2025-07-14 | Resolved: 2025-08-25

## 2025-08-27 ENCOUNTER — TELEPHONE (OUTPATIENT)
Dept: INTERNAL MEDICINE | Facility: CLINIC | Age: 80
End: 2025-08-27
Payer: MEDICARE